# Patient Record
Sex: FEMALE | Race: WHITE | HISPANIC OR LATINO | Employment: FULL TIME | URBAN - METROPOLITAN AREA
[De-identification: names, ages, dates, MRNs, and addresses within clinical notes are randomized per-mention and may not be internally consistent; named-entity substitution may affect disease eponyms.]

---

## 2017-01-11 ENCOUNTER — TELEPHONE (OUTPATIENT)
Dept: FAMILY MEDICINE | Facility: CLINIC | Age: 52
End: 2017-01-11

## 2017-01-11 NOTE — TELEPHONE ENCOUNTER
Panel Management Review      Patient has the following on her problem list: None      Composite cancer screening  Chart review shows that this patient is due/due soon for the following Mammogram and Colonoscopy  Summary:    Patient is due/failing the following:   COLONOSCOPY and MAMMOGRAM    Action needed:   Pt needs to make an appt for a mammogram and a colon cancer screen.     Type of outreach:    Sent [employer] message.    Questions for provider review:    None                                                                                                                                    Leydi Cobb MA

## 2017-01-20 ENCOUNTER — DOCUMENTATION ONLY (OUTPATIENT)
Dept: FAMILY MEDICINE | Facility: CLINIC | Age: 52
End: 2017-01-20

## 2017-03-24 ENCOUNTER — OFFICE VISIT (OUTPATIENT)
Dept: FAMILY MEDICINE | Facility: CLINIC | Age: 52
End: 2017-03-24
Payer: COMMERCIAL

## 2017-03-24 VITALS
TEMPERATURE: 98.2 F | WEIGHT: 136.1 LBS | HEART RATE: 71 BPM | DIASTOLIC BLOOD PRESSURE: 80 MMHG | BODY MASS INDEX: 23.23 KG/M2 | HEIGHT: 64 IN | RESPIRATION RATE: 16 BRPM | SYSTOLIC BLOOD PRESSURE: 102 MMHG | OXYGEN SATURATION: 99 %

## 2017-03-24 DIAGNOSIS — N95.0 POST-MENOPAUSAL BLEEDING: ICD-10-CM

## 2017-03-24 DIAGNOSIS — Z13.1 SCREENING FOR DIABETES MELLITUS: Primary | ICD-10-CM

## 2017-03-24 DIAGNOSIS — R42 VERTIGO: ICD-10-CM

## 2017-03-24 DIAGNOSIS — Z11.59 NEED FOR HEPATITIS C SCREENING TEST: ICD-10-CM

## 2017-03-24 DIAGNOSIS — J31.0 CHRONIC RHINITIS: ICD-10-CM

## 2017-03-24 DIAGNOSIS — G56.03 BILATERAL CARPAL TUNNEL SYNDROME: ICD-10-CM

## 2017-03-24 DIAGNOSIS — Z13.220 LIPID SCREENING: ICD-10-CM

## 2017-03-24 DIAGNOSIS — G43.101 MIGRAINE WITH AURA AND WITH STATUS MIGRAINOSUS, NOT INTRACTABLE: ICD-10-CM

## 2017-03-24 DIAGNOSIS — J30.2 SEASONAL ALLERGIES: ICD-10-CM

## 2017-03-24 DIAGNOSIS — J32.0 CHRONIC MAXILLARY SINUSITIS: ICD-10-CM

## 2017-03-24 DIAGNOSIS — Z23 NEED FOR PROPHYLACTIC VACCINATION WITH TETANUS-DIPHTHERIA (TD): ICD-10-CM

## 2017-03-24 DIAGNOSIS — Z12.4 SCREENING FOR MALIGNANT NEOPLASM OF CERVIX: ICD-10-CM

## 2017-03-24 DIAGNOSIS — Z12.31 VISIT FOR SCREENING MAMMOGRAM: ICD-10-CM

## 2017-03-24 DIAGNOSIS — Z12.11 SCREEN FOR COLON CANCER: ICD-10-CM

## 2017-03-24 PROCEDURE — 90471 IMMUNIZATION ADMIN: CPT | Performed by: NURSE PRACTITIONER

## 2017-03-24 PROCEDURE — G0145 SCR C/V CYTO,THINLAYER,RESCR: HCPCS | Performed by: NURSE PRACTITIONER

## 2017-03-24 PROCEDURE — 87624 HPV HI-RISK TYP POOLED RSLT: CPT | Performed by: NURSE PRACTITIONER

## 2017-03-24 PROCEDURE — 90715 TDAP VACCINE 7 YRS/> IM: CPT | Performed by: NURSE PRACTITIONER

## 2017-03-24 PROCEDURE — 99396 PREV VISIT EST AGE 40-64: CPT | Mod: 25 | Performed by: NURSE PRACTITIONER

## 2017-03-24 RX ORDER — SUMATRIPTAN 100 MG/1
TABLET, FILM COATED ORAL
Qty: 18 TABLET | Refills: 3 | Status: SHIPPED | OUTPATIENT
Start: 2017-03-24 | End: 2017-04-14

## 2017-03-24 RX ORDER — MECLIZINE HYDROCHLORIDE 25 MG/1
25 TABLET ORAL EVERY 6 HOURS PRN
Qty: 90 TABLET | Refills: 3 | Status: SHIPPED | OUTPATIENT
Start: 2017-03-24 | End: 2018-03-30

## 2017-03-24 RX ORDER — CETIRIZINE HYDROCHLORIDE 10 MG/1
10 TABLET ORAL EVERY EVENING
Qty: 90 TABLET | Refills: 3 | Status: SHIPPED | OUTPATIENT
Start: 2017-03-24 | End: 2018-03-30

## 2017-03-24 RX ORDER — CEFUROXIME AXETIL 250 MG/1
6 TABLET ORAL
Qty: 1 KIT | Refills: 0 | Status: SHIPPED | OUTPATIENT
Start: 2017-03-24 | End: 2017-04-14

## 2017-03-24 RX ORDER — FLUTICASONE PROPIONATE 50 MCG
1-2 SPRAY, SUSPENSION (ML) NASAL DAILY
Qty: 48 G | Refills: 3 | Status: SHIPPED | OUTPATIENT
Start: 2017-03-24 | End: 2018-03-30 | Stop reason: SINTOL

## 2017-03-24 NOTE — MR AVS SNAPSHOT
After Visit Summary   3/24/2017    Merlene Mace    MRN: [id number]           Patient Information     Date Of Birth          1965        Visit Information        Provider Department      3/24/2017 1:00 PM Stephany Ferrer APRN [facility]        Today's Diagnoses     Screening for diabetes mellitus    -  1    Screen for colon cancer        Visit for screening mammogram        Screening for malignant neoplasm of cervix        Need for hepatitis C screening test        Need for prophylactic vaccination with tetanus-diphtheria (TD)        Lipid screening        Migraine with aura and with status migrainosus, not intractable        Chronic rhinitis        Chronic maxillary sinusitis        Seasonal allergies        Vertigo        Post-menopausal bleeding        Bilateral carpal tunnel syndrome          Care Instructions    Try the injectable imitrex and if headaches are not getting better follow up with me and we will try a different preventive med.  OK for phone visit.  Track for three months.      Watch imitrex use as it can become a cause of migraines    Pelvic ultrasound  will call you    [employer] labs fasting. Make an appt.      You have orders for a fit test.  You can pick it up whenever you want.    Mammo  will call you    Carpal tunnel orthopedics will call you                Follow-ups after your visit        Additional Services     ORTHO  REFERRAL       [facility] is referring you to the Orthopedic  Services at [facility].       The  Representative will assist you in the coordination of your Orthopedic and Musculoskeletal Care as prescribed by your physician.    The  Representative will call you within 1 business day to help schedule your appointment, or you may contact the  Representative at:    All areas ~ [phone]     Type of Referral : Hand       Timeframe  requested: 3 - 5 days    Coverage of these services is subject to the terms and limitations of your health insurance plan.  Please call member services at your health plan with any benefit or coverage questions.      If X-rays, CT or MRI's have been performed, please contact the facility where they were done to arrange for , prior to your scheduled appointment.  Please bring this referral request to your appointment and present it to your specialist.                  Future tests that were ordered for you today     Open Future Orders        Priority Expected Expires Ordered    US Pelvic Complete w Transvaginal Routine 6/22/2017 3/24/2018 3/24/2017    Fecal colorectal cancer screen (FIT) Routine 4/14/2017 6/16/2017 3/24/2017    GLUCOSE Routine  3/24/2018 3/24/2017    MA SCREENING DIGITAL BILAT - Future  (s+30) Routine  3/24/2018 3/24/2017            Who to contact     If you have questions or need follow up information about today's clinic visit or your schedule please contact [facility] directly at [phone].  Normal or non-critical lab and imaging results will be communicated to you by MyChart, letter or phone within 4 business days after the clinic has received the results. If you do not hear from us within 7 days, please contact the clinic through [employer] or phone. If you have a critical or abnormal lab result, we will notify you by phone as soon as possible.  Submit refill requests through [employer] or call your pharmacy and they will forward the refill request to us. Please allow 3 business days for your refill to be completed.          Additional Information About Your Visit        e[employer] Information     [employer] gives you secure access to your electronic health record. If you see a primary care provider, you can also send messages to your care team and make appointments. If you have questions, please call your primary care clinic.  If you do not have a primary care provider, please  "call [phone] and they will assist you.        Care EveryWhere ID     This is your Care EveryWhere ID. This could be used by other organizations to access your [place] medical records  [id number]        Your Vitals Were     Pulse Temperature Respirations Height Pulse Oximetry BMI (Body Mass Index)    71 98.2  F (36.8  C) (Oral) 16 5' 4\" (1.626 m) 99% 23.36 kg/m2       Blood Pressure from Last 3 Encounters:   03/24/17 102/80   03/25/16 100/70   03/27/15 100/68    Weight from Last 3 Encounters:   03/24/17 136 lb 1.6 oz (61.7 kg)   03/25/16 143 lb (64.9 kg)   03/27/15 138 lb 1.6 oz (62.6 kg)              We Performed the Following     HPV High Risk Types DNA Cervical     Lipid panel reflex to direct LDL     ORTHO  REFERRAL     Pap imaged thin layer screen with HPV - recommended age 30 - 65 years (select HPV order below)     TDAP VACCINE (ADACEL)          Today's Medication Changes          These changes are accurate as of: 3/24/17  1:46 PM.  If you have any questions, ask your nurse or doctor.               These medicines have changed or have updated prescriptions.        Dose/Directions    * SUMAtriptan 100 MG tablet   Commonly known as:  IMITREX   This may have changed:  Another medication with the same name was added. Make sure you understand how and when to take each.   Used for:  Migraine with aura and with status migrainosus, not intractable   Changed by:  Stephany Ferrer APRN CNP        May repeat in 2 hours if needed: max 2/day; average number of headaches monthly 1 TABLET 1 TIME ONLY, may repeat in 2 hours   Quantity:  18 tablet   Refills:  3       * SUMAtriptan 6 MG/0.5ML pen injector kit   Commonly known as:  IMITREX STATDOSE   This may have changed:  You were already taking a medication with the same name, and this prescription was added. Make sure you understand how and when to take each.   Used for:  Migraine with aura and with status migrainosus, not intractable   Changed by:  Nabil" KIRT Hammond CNP        Dose:  6 mg   Inject 0.5 mLs (6 mg) Subcutaneous at onset of headache for migraine May repeat in 1 hour. Max 12 mg/24 hours.   Quantity:  1 kit   Refills:  0       * SUMAtriptan 6 MG/0.5ML refill cartridge   Commonly known as:  IMITREX STATDOSE   This may have changed:  You were already taking a medication with the same name, and this prescription was added. Make sure you understand how and when to take each.   Used for:  Migraine with aura and with status migrainosus, not intractable   Changed by:  Stephany Ferrer APRN CNP        Dose:  6 mg   Inject 0.5 mLs (6 mg) Subcutaneous at onset of headache for migraine May repeat in 1 hour. Max 12 mg/24 hours.   Quantity:  2 cartridge   Refills:  3       * Notice:  This list has 3 medication(s) that are the same as other medications prescribed for you. Read the directions carefully, and ask your doctor or other care provider to review them with you.         Where to get your medicines      These medications were sent to [facility] [address] [place] [address] - [address] [id number] [facility] [address]     Phone:  [phone]     cetirizine 10 MG tablet    fluticasone 50 MCG/ACT spray    meclizine 25 MG tablet    SUMAtriptan 100 MG tablet    SUMAtriptan 6 MG/0.5ML pen injector kit    SUMAtriptan 6 MG/0.5ML refill cartridge                Primary Care Provider Office Phone # Fax #    KIRT Tony [phone]       [facility] [address]  [facility] [address]        Thank you!     Thank you for choosing [facility]  for your care. Our goal is always to provide you with excellent care. Hearing back from our patients is one way we can continue to improve our services. Please take a few minutes to complete the written survey that you may receive in the mail after your visit with us. Thank you!             Your Updated Medication List - Protect others around you: Learn how to  safely use, store and throw away your medicines at www.disposemymeds.org.          This list is accurate as of: 3/24/17  1:46 PM.  Always use your most recent med list.                   Brand Name Dispense Instructions for use    cetirizine 10 MG tablet    zyrTEC    90 tablet    Take 1 tablet (10 mg) by mouth every evening       fluticasone 50 MCG/ACT spray    FLONASE    48 g    Spray 1-2 sprays into both nostrils daily       meclizine 25 MG tablet    ANTIVERT    90 tablet    Take 1 tablet (25 mg) by mouth every 6 hours as needed for dizziness       * SUMAtriptan 100 MG tablet    IMITREX    18 tablet    May repeat in 2 hours if needed: max 2/day; average number of headaches monthly 1 TABLET 1 TIME ONLY, may repeat in 2 hours       * SUMAtriptan 6 MG/0.5ML pen injector kit    IMITREX STATDOSE    1 kit    Inject 0.5 mLs (6 mg) Subcutaneous at onset of headache for migraine May repeat in 1 hour. Max 12 mg/24 hours.       * SUMAtriptan 6 MG/0.5ML refill cartridge    IMITREX STATDOSE    2 cartridge    Inject 0.5 mLs (6 mg) Subcutaneous at onset of headache for migraine May repeat in 1 hour. Max 12 mg/24 hours.       * Notice:  This list has 3 medication(s) that are the same as other medications prescribed for you. Read the directions carefully, and ask your doctor or other care provider to review them with you.

## 2017-03-24 NOTE — PATIENT INSTRUCTIONS
Try the injectable imitrex and if headaches are not getting better follow up with me and we will try a different preventive med.  OK for phone visit.  Track for three months.      Watch imitrex use as it can become a cause of migraines    Pelvic ultrasound  will call you    [employer] labs fasting. Make an appt.      You have orders for a fit test.  You can pick it up whenever you want.    Mammo  will call you    Carpal tunnel orthopedics will call you

## 2017-03-24 NOTE — NURSING NOTE
"Chief Complaint   Patient presents with     Physical       Initial /80 (BP Location: Right arm, Patient Position: Chair, Cuff Size: Adult Regular)  Pulse 71  Temp 98.2  F (36.8  C) (Oral)  Resp 16  Ht 5' 4\" (1.626 m)  Wt 136 lb 1.6 oz (61.7 kg)  SpO2 99%  BMI 23.36 kg/m2 Estimated body mass index is 23.36 kg/(m^2) as calculated from the following:    Height as of this encounter: 5' 4\" (1.626 m).    Weight as of this encounter: 136 lb 1.6 oz (61.7 kg).  Medication Reconciliation: complete   Leydi Cobb MA      "

## 2017-03-24 NOTE — PROGRESS NOTES
SUBJECTIVE:     CC: Merlene Mace is an 51 year old woman who presents for preventive health visit.     Concerns:  Would like to try the imitrex injection.  Migraines are often sudden onset without aura.  Triggers are allergy, weather, sinuses.  Lack of sleep and stress also contribute.  1-2 per month but headaches last several days.  No new features to headaches.    Also:  Carpal tunnel symptoms are worsening despite using braces.  Having more symptoms affecting ADL.  Would like a referral. No weakness that is noted    Physical   Annual:     Getting at least 3 servings of Calcium per day::  Yes    Bi-annual eye exam::  Yes    Dental care twice a year::  Yes    Sleep apnea or symptoms of sleep apnea::  None    Diet::  Regular (no restrictions)    Frequency of exercise::  6-7 days/week    Duration of exercise::  30-45 minutes    Taking medications regularly::  Yes    Medication side effects::  None    Additional concerns today::  No            Today's PHQ-2 Score:   PHQ-2 ( 1999 Pfizer) 3/21/2017   Little interest or pleasure in doing things Not at all   Feeling down, depressed or hopeless Not at all   PHQ-2 Score 0       Abuse: Current or Past(Physical, Sexual or Emotional)- No  Do you feel safe in your environment - Yes    Social History   Substance Use Topics     Smoking status: Never Smoker     Smokeless tobacco: Never Used     Alcohol use Yes      Comment: occasional, one every few months     The patient does not drink >3 drinks per day nor >7 drinks per week.    Recent Labs   Lab Test  03/12/09   0923   CHOL  199   HDL  65   LDL  112   TRIG  111   CHOLHDLRATIO  3.1       Reviewed orders with patient.  Reviewed health maintenance and updated orders accordingly - Yes    ROS:  C: NEGATIVE for fever, chills, change in weight  I: NEGATIVE for worrisome rashes, moles or lesions  E: NEGATIVE for vision changes or irritation  ENT: NEGATIVE for ear, mouth and throat problems  R: NEGATIVE for significant cough or  "SOB  B: NEGATIVE for masses, tenderness or discharge  CV: NEGATIVE for chest pain, palpitations or peripheral edema  GI: NEGATIVE for nausea, abdominal pain, heartburn, or change in bowel habits  : NEGATIVE for unusual urinary or vaginal symptoms. No vaginal bleeding.  M: NEGATIVE for significant arthralgias or myalgia  N: NEGATIVE for weakness, dizziness or paresthesias  P: NEGATIVE for changes in mood or affect     Problem list, Medication list, Allergies, and Medical/Social/Surgical histories reviewed in [facility] and updated as appropriate.  OBJECTIVE:     /80 (BP Location: Right arm, Patient Position: Chair, Cuff Size: Adult Regular)  Pulse 71  Temp 98.2  F (36.8  C) (Oral)  Resp 16  Ht 5' 4\" (1.626 m)  Wt 136 lb 1.6 oz (61.7 kg)  SpO2 99%  BMI 23.36 kg/m2  EXAM:  GENERAL APPEARANCE: healthy, alert and no distress  EYES: Eyes grossly normal to inspection, PERRL and conjunctivae and sclerae normal  HENT: ear canals and TM's normal, nose and mouth without ulcers or lesions, oropharynx clear and oral mucous membranes moist  NECK: no adenopathy, no asymmetry, masses, or scars and thyroid normal to palpation  RESP: lungs clear to auscultation - no rales, rhonchi or wheezes  BREAST: normal without masses, tenderness or nipple discharge and no palpable axillary masses or adenopathy  CV: regular rate and rhythm, normal S1 S2, no S3 or S4, no murmur, click or rub, no peripheral edema and peripheral pulses strong  ABDOMEN: soft, nontender, no hepatosplenomegaly, no masses and bowel sounds normal  MS: no musculoskeletal defects are noted and gait is age appropriate without ataxia  SKIN: no suspicious lesions or rashes  NEURO: Normal strength and tone, sensory exam grossly normal, mentation intact and speech normal  PSYCH: mentation appears normal and affect normal/bright    ASSESSMENT/PLAN:         ICD-10-CM    1. Screening for diabetes mellitus Z13.1 GLUCOSE   2. Screen for colon cancer Z12.11 Fecal colorectal " "cancer screen (FIT)   3. Visit for screening mammogram Z12.31 MA SCREENING DIGITAL BILAT - Future  (s+30)   4. Screening for malignant neoplasm of cervix Z12.4 Pap imaged thin layer screen with HPV - recommended age 30 - 65 years (select HPV order below)     HPV High Risk Types DNA Cervical   5. Need for hepatitis C screening test Z11.59    6. Need for prophylactic vaccination with tetanus-diphtheria (TD) Z23 TDAP VACCINE (ADACEL)   7. Lipid screening Z13.220 **Lipid panel reflex to direct LDL FUTURE 1yr     CANCELED: Lipid panel reflex to direct LDL   8. Migraine with aura and with status migrainosus, not intractable G43.101 SUMAtriptan (IMITREX) 100 MG tablet     SUMAtriptan (IMITREX STATDOSE) 6 MG/0.5ML pen injector kit     SUMAtriptan (IMITREX STATDOSE) 6 MG/0.5ML refill cartridge   9. Chronic rhinitis J31.0 fluticasone (FLONASE) 50 MCG/ACT spray   10. Chronic maxillary sinusitis J32.0 cetirizine (ZYRTEC) 10 MG tablet   11. Seasonal allergies J30.2 cetirizine (ZYRTEC) 10 MG tablet   12. Vertigo R42 meclizine (ANTIVERT) 25 MG tablet   13. Post-menopausal bleeding N95.0 US Pelvic Complete w Transvaginal   14. Bilateral carpal tunnel syndrome G56.03 ORTHO  REFERRAL       COUNSELING:  Reviewed preventive health counseling, as reflected in patient instructions         reports that she has never smoked. She has never used smokeless tobacco.    Estimated body mass index is 23.36 kg/(m^2) as calculated from the following:    Height as of this encounter: 5' 4\" (1.626 m).    Weight as of this encounter: 136 lb 1.6 oz (61.7 kg).       Counseling Resources:  ATP IV Guidelines  Pooled Cohorts Equation Calculator  Breast Cancer Risk Calculator  FRAX Risk Assessment  ICSI Preventive Guidelines  Dietary Guidelines for Americans, 2010  USDA's MyPlate  ASA Prophylaxis  Lung CA Screening    KIRT Tony [facility] [place]  "

## 2017-03-24 NOTE — NURSING NOTE
Screening Questionnaire for Adult Immunization    Are you sick today?   No   Do you have allergies to medications, food, a vaccine component or latex?   No   Have you ever had a serious reaction after receiving a vaccination?   No   Do you have a long-term health problem with heart disease, lung disease, asthma, kidney disease, metabolic disease (e.g. diabetes), anemia, or other blood disorder?   No   Do you have cancer, leukemia, HIV/AIDS, or any other immune system problem?   No   In the past 3 months, have you taken medications that affect  your immune system, such as prednisone, other steroids, or anticancer drugs; drugs for the treatment of rheumatoid arthritis, Crohn s disease, or psoriasis; or have you had radiation treatments?   No   Have you had a seizure, or a brain or other nervous system problem?   No   During the past year, have you received a transfusion of blood or blood     products, or been given immune (gamma) globulin or antiviral drug?   No   For women: Are you pregnant or is there a chance you could become        pregnant during the next month?   No   Have you received any vaccinations in the past 4 weeks?   No     Immunization questionnaire answers were all negative.      MNVFC doesn't apply on this patient    Per orders of Liliana Ferrer, injection of Tdap given by Leydi Cobb. Patient instructed to remain in clinic for 20 minutes afterwards, and to report any adverse reaction to me immediately.       Screening performed by Leydi Cobb on 3/24/2017 at 2:22 PM.

## 2017-03-28 ENCOUNTER — TELEPHONE (OUTPATIENT)
Dept: FAMILY MEDICINE | Facility: CLINIC | Age: 52
End: 2017-03-28

## 2017-03-28 DIAGNOSIS — G43.909 MIGRAINE: Primary | ICD-10-CM

## 2017-03-28 LAB
COPATH REPORT: NORMAL
PAP: NORMAL

## 2017-03-28 NOTE — TELEPHONE ENCOUNTER
PA has been started through covermymeds.com, will wait for response.     Medication: SUMAtriptan (IMITREX STATDOSE) 6 MG/0.5ML pen injector kit and SUMAtriptan (IMITREX STATDOSE) 6 MG/0.5ML refill cartridge   Insurance ID: [id number]  Insurance ph #: [phone]  Leydi Cobb MA

## 2017-03-29 LAB
FINAL DIAGNOSIS: NORMAL
HPV HR 12 DNA CVX QL NAA+PROBE: NEGATIVE
HPV16 DNA SPEC QL NAA+PROBE: NEGATIVE
HPV18 DNA SPEC QL NAA+PROBE: NEGATIVE
SPECIMEN DESCRIPTION: NORMAL

## 2017-04-06 NOTE — TELEPHONE ENCOUNTER
PA has been denied due to pt not trying and/or failing on formulary options. Formulary medications covered are rizatriptan, sumatriptan 5mg, or 20mg nasal spray.     Please review, and if okay, please send a new script to patients pharmacy.   Leydi Cobb MA

## 2017-04-11 NOTE — TELEPHONE ENCOUNTER
Second attempt to contact pt regarding medication change. LM for pt to return call to clinic.   Leydi Cobb MA

## 2017-04-12 NOTE — TELEPHONE ENCOUNTER
Pt calls back.   She says she is already taking the sumatriptan 100 mg orally.  She said that she would try the sumatriptan nasal spray to go along with it unless you think the other would be better.      Will forward to Liliana Ferrer.

## 2017-04-14 RX ORDER — SUMATRIPTAN 20 MG/1
1 SPRAY NASAL PRN
Qty: 1 EACH | Refills: 1 | Status: SHIPPED | OUTPATIENT
Start: 2017-04-14 | End: 2019-04-19

## 2017-04-14 NOTE — TELEPHONE ENCOUNTER
Please let pt know that I sent the sumatriptan nasal spray for her to the pharmacy.  She should be aware that the max dose per day is 2 sprays per day.  She should consider an oral dose of sumatriptan pills to be equivalent to one spray of the nasal form and count this in the daily max dose as well.

## 2017-04-17 ENCOUNTER — HOSPITAL ENCOUNTER (OUTPATIENT)
Dept: MAMMOGRAPHY | Facility: CLINIC | Age: 52
Discharge: HOME | End: 2017-04-17
Attending: NURSE PRACTITIONER | Admitting: NURSE PRACTITIONER
Payer: COMMERCIAL

## 2017-04-17 ENCOUNTER — OFFICE VISIT (OUTPATIENT)
Dept: ORTHOPEDICS | Facility: CLINIC | Age: 52
End: 2017-04-17
Payer: COMMERCIAL

## 2017-04-17 VITALS
HEIGHT: 64 IN | DIASTOLIC BLOOD PRESSURE: 72 MMHG | BODY MASS INDEX: 23.22 KG/M2 | WEIGHT: 136 LBS | SYSTOLIC BLOOD PRESSURE: 92 MMHG

## 2017-04-17 DIAGNOSIS — Z12.31 VISIT FOR SCREENING MAMMOGRAM: ICD-10-CM

## 2017-04-17 DIAGNOSIS — G56.03 BILATERAL CARPAL TUNNEL SYNDROME: Primary | ICD-10-CM

## 2017-04-17 PROCEDURE — 99203 OFFICE O/P NEW LOW 30 MIN: CPT | Performed by: ORTHOPAEDIC SURGERY

## 2017-04-17 PROCEDURE — G0202 SCR MAMMO BI INCL CAD: HCPCS

## 2017-04-17 NOTE — PATIENT INSTRUCTIONS
What is Carpal Tunnel Syndrome (CTS)?  Carpal tunnel syndrome (CTS) is a problem that affects the wrist and hand. If you have CTS, tingling and numbness can make even simple tasks hard to do. But CTS can be treated, and your symptoms can be controlled.    Learning about carpal tunnel  The carpal tunnel is a narrow space inside the wrist that is surrounded by bone and ligament. This space lets certain tendons and a major nerve pass from the forearm into the hand. With CTS, the tendon sheaths may thicken and enlarge. This reduces the amount of space inside the carpal tunnel. As a result, the median nerve may be compressed.  The symptoms of CTS  Tingling and numbness are the most common symptoms of CTS. Some people also have hand pain or even a weakened . At first, symptoms may wake you up at night. Later, they may also occur during your daily routines. For instance, you may notice symptoms while you are driving or holding a newspaper. Your symptoms may become more severe over time.     Symptoms of CTS may keep you up at night.    Working with your doctor  Your doctor will perform an exam to learn more about your symptoms. Once your problem is diagnosed, you and your doctor can make a treatment plan. He or she may recommend wrist braces or splints, local corticosteroid injections, and/or surgery, depending on your history and the severity of your physical exam findings. If you have surgery, you are likely to go home the same day.    [id number] [employer]. [address] [place] [address]. All rights reserved. This information is not intended as a substitute for professional medical care. Always follow your healthcare professional's instructions.        Carpal Tunnel Release Surgery    Surgery may be done if your carpal tunnel syndrome (CTS) symptoms become severe. Or, you may have surgery if no other treatment brings relief. There are 2 types of CTS procedures. You will be told about the  one you will have. You ll also be instructed how to prepare for it.    The goals of surgery  Two types of surgery--open and endoscopic--are used to treat CTS.    With open surgery, your surgeon makes one incision in your palm. Standard surgical tools are used.    With endoscopic surgery, one or two small incisions may be made in your hand. A scope (with a very small camera attached) and tools are inserted under the carpal ligament. The surgeon then operates while watching images on a video screen. No matter which one you have, the goal remains the same: Your surgeon will relieve pressure on the median nerve. To do this, the transverse carpal ligament is cut (released).  After surgery  If you ve had carpal tunnel surgery, you will spend a few hours resting before you go home. The nerve sensation and circulation in your hand will be checked at this time. For the safest healing, keep the following in mind.    Keep your hand raised above heart level. This will help reduce swelling.    Limit hand and wrist use as instructed. A wrist brace may be required.    Take any pain medication as directed.    Do hand exercises as directed by your surgeon or therapist.  When to call the surgeon  Call your surgeon if you notice any of the following:    White or pale-blue hand or nails (If you pinch your skin or nail and the color doesn t return)    Pain that is not relieved by prescribed medicine    Loss of sensation or excess swelling in hand or fingers    Fever over 100.4 F (38 C)     7982-4899 [employer]. [address] [place] [address], [place] [address]. All rights reserved. This information is not intended as a substitute for professional medical care. Always follow your healthcare professional's instructions.

## 2017-04-17 NOTE — MR AVS SNAPSHOT
After Visit Summary   4/17/2017    Merlene Mace    MRN: [id number]           Patient Information     Date Of Birth          1965        Visit Information        Provider Department      4/17/2017 9:50 AM Darryl Ledesma MD [facility]        Today's Diagnoses     Bilateral carpal tunnel syndrome    -  1      Care Instructions      What is Carpal Tunnel Syndrome (CTS)?  Carpal tunnel syndrome (CTS) is a problem that affects the wrist and hand. If you have CTS, tingling and numbness can make even simple tasks hard to do. But CTS can be treated, and your symptoms can be controlled.    Learning about carpal tunnel  The carpal tunnel is a narrow space inside the wrist that is surrounded by bone and ligament. This space lets certain tendons and a major nerve pass from the forearm into the hand. With CTS, the tendon sheaths may thicken and enlarge. This reduces the amount of space inside the carpal tunnel. As a result, the median nerve may be compressed.  The symptoms of CTS  Tingling and numbness are the most common symptoms of CTS. Some people also have hand pain or even a weakened . At first, symptoms may wake you up at night. Later, they may also occur during your daily routines. For instance, you may notice symptoms while you are driving or holding a newspaper. Your symptoms may become more severe over time.     Symptoms of CTS may keep you up at night.    Working with your doctor  Your doctor will perform an exam to learn more about your symptoms. Once your problem is diagnosed, you and your doctor can make a treatment plan. He or she may recommend wrist braces or splints, local corticosteroid injections, and/or surgery, depending on your history and the severity of your physical exam findings. If you have surgery, you are likely to go home the same day.    7970-4387 [employer]. [address] [place] [address], [place] [address]. All rights reserved. This  information is not intended as a substitute for professional medical care. Always follow your healthcare professional's instructions.        Carpal Tunnel Release Surgery    Surgery may be done if your carpal tunnel syndrome (CTS) symptoms become severe. Or, you may have surgery if no other treatment brings relief. There are 2 types of CTS procedures. You will be told about the one you will have. You ll also be instructed how to prepare for it.    The goals of surgery  Two types of surgery--open and endoscopic--are used to treat CTS.    With open surgery, your surgeon makes one incision in your palm. Standard surgical tools are used.    With endoscopic surgery, one or two small incisions may be made in your hand. A scope (with a very small camera attached) and tools are inserted under the carpal ligament. The surgeon then operates while watching images on a video screen. No matter which one you have, the goal remains the same: Your surgeon will relieve pressure on the median nerve. To do this, the transverse carpal ligament is cut (released).  After surgery  If you ve had carpal tunnel surgery, you will spend a few hours resting before you go home. The nerve sensation and circulation in your hand will be checked at this time. For the safest healing, keep the following in mind.    Keep your hand raised above heart level. This will help reduce swelling.    Limit hand and wrist use as instructed. A wrist brace may be required.    Take any pain medication as directed.    Do hand exercises as directed by your surgeon or therapist.  When to call the surgeon  Call your surgeon if you notice any of the following:    White or pale-blue hand or nails (If you pinch your skin or nail and the color doesn t return)    Pain that is not relieved by prescribed medicine    Loss of sensation or excess swelling in hand or fingers    Fever over 100.4 F (38 C)     1612-3792 [employer]. [address] [place] [address], [place]  "[address]. All rights reserved. This information is not intended as a substitute for professional medical care. Always follow your healthcare professional's instructions.              Follow-ups after your visit        Who to contact     If you have questions or need follow up information about today's clinic visit or your schedule please contact [facility] ORTHOPEDIC SURGERY directly at [phone].  Normal or non-critical lab and imaging results will be communicated to you by MyChart, letter or phone within 4 business days after the clinic has received the results. If you do not hear from us within 7 days, please contact the clinic through [employer] or phone. If you have a critical or abnormal lab result, we will notify you by phone as soon as possible.  Submit refill requests through [employer] or call your pharmacy and they will forward the refill request to us. Please allow 3 business days for your refill to be completed.          Additional Information About Your Visit        [employer] Information     [employer] gives you secure access to your electronic health record. If you see a primary care provider, you can also send messages to your care team and make appointments. If you have questions, please call your primary care clinic.  If you do not have a primary care provider, please call [phone] and they will assist you.        Care EveryWhere ID     This is your Care EveryWhere ID. This could be used by other organizations to access your [place] medical records  [id number]        Your Vitals Were     Height BMI (Body Mass Index)                5' 4\" (1.626 m) 23.34 kg/m2           Blood Pressure from Last 3 Encounters:   04/17/17 92/72   03/24/17 102/80   03/25/16 100/70    Weight from Last 3 Encounters:   04/17/17 136 lb (61.7 kg)   03/24/17 136 lb 1.6 oz (61.7 kg)   03/25/16 143 lb (64.9 kg)              Today, you had the following     No orders found for display       Primary Care Provider Office Phone # " Fax #    KIRT Tony [phone]       [facility] [address]  [facility] [address]        Thank you!     Thank you for choosing [facility] ORTHOPEDIC SURGERY  for your care. Our goal is always to provide you with excellent care. Hearing back from our patients is one way we can continue to improve our services. Please take a few minutes to complete the written survey that you may receive in the mail after your visit with us. Thank you!             Your Updated Medication List - Protect others around you: Learn how to safely use, store and throw away your medicines at www.disposemymeds.org.          This list is accurate as of: 4/17/17 10:17 AM.  Always use your most recent med list.                   Brand Name Dispense Instructions for use    cetirizine 10 MG tablet    zyrTEC    90 tablet    Take 1 tablet (10 mg) by mouth every evening       fluticasone 50 MCG/ACT spray    FLONASE    48 g    Spray 1-2 sprays into both nostrils daily       meclizine 25 MG tablet    ANTIVERT    90 tablet    Take 1 tablet (25 mg) by mouth every 6 hours as needed for dizziness       SUMAtriptan 20 MG/ACT nasal spray    IMITREX    1 each    Spray 1 spray in nostril as needed for migraine May repeat in 2 hours. Max 2 sprays/24 hours.

## 2017-04-17 NOTE — LETTER
4/17/2017       RE: Merlene Mace  [address] [place] [address]           Dear Colleague,    Thank you for referring your patient, Merlene Mace, to the [facility] ORTHOPEDIC SURGERY. Please see a copy of my visit note below.    HISTORY OF PRESENT ILLNESS:    Merlene Mace is a 51 year old female who is seen in consultation at the request of Dr. Ferrer for bilateral carpal tunnel    Present symptoms: Pt states symptoms have been present since 2004.  Pt states right side is worse than the left, pt is right hand dominant.  Pt states numbness is almost constant now, it is in the thumb, index and middle fingers.  Pt states she is having more weakness in the hands, difficulty opening sandwich bags.  Pt states she had some relief with the hand therapy, braces and injections but symptoms have progressed since that time.  She is right-hand dominant.  Treatments tried to this point: cortisone injections, hand therapy, EMG - 2004  Orthopedic PMH: no ortho surgeries     Past Medical History:   Diagnosis Date     Carpal tunnel syndrome      Dysfunction of eustachian tube      Migraine, unspecified, without mention of intractable migraine without mention of status migrainosus        Past Surgical History:   Procedure Laterality Date     CL AFF SURGICAL PATHOLOGY  1993       Family History   Problem Relation Age of Onset     Family History Negative Mother      alive age 58     Family History Negative Father      alive age 60     CANCER Maternal Grandmother      brain       Social History     Social History     Marital status: Single     Spouse name: N/A     Number of children: N/A     Years of education: 12     Occupational History     --research consulting      Social History Main Topics     Smoking status: Never Smoker     Smokeless tobacco: Never Used     Alcohol use Yes      Comment: occasional, one every few months     Drug use: No     Sexual activity: Yes     Partners: Male       "Comment: Ortho Evra Patch     Other Topics Concern     Not on file     Social History Narrative       Current Outpatient Prescriptions   Medication Sig Dispense Refill     SUMAtriptan (IMITREX) 20 MG/ACT nasal spray Spray 1 spray in nostril as needed for migraine May repeat in 2 hours. Max 2 sprays/24 hours. (Patient not taking: Reported on 4/17/2017) 1 each 1     fluticasone (FLONASE) 50 MCG/ACT spray Spray 1-2 sprays into both nostrils daily (Patient not taking: Reported on 4/17/2017) 48 g 3     cetirizine (ZYRTEC) 10 MG tablet Take 1 tablet (10 mg) by mouth every evening (Patient not taking: Reported on 4/17/2017) 90 tablet 3     meclizine (ANTIVERT) 25 MG tablet Take 1 tablet (25 mg) by mouth every 6 hours as needed for dizziness (Patient not taking: Reported on 4/17/2017) 90 tablet 3       Allergies   Allergen Reactions     No Known Drug Allergies        REVIEW OF SYSTEMS:  CONSTITUTIONAL:  NEGATIVE for fever, chills, change in weight  INTEGUMENTARY/SKIN:  NEGATIVE for worrisome rashes, moles or lesions  EYES:  NEGATIVE for vision changes or irritation  ENT/MOUTH:  NEGATIVE for ear, mouth and throat problems  RESP:  NEGATIVE for significant cough or SOB  BREAST:  NEGATIVE for masses, tenderness or discharge  CV:  NEGATIVE for chest pain, palpitations or peripheral edema  GI:  NEGATIVE for nausea, abdominal pain, heartburn, or change in bowel habits  :  Negative   MUSCULOSKELETAL:  See HPI above  NEURO:  NEGATIVE for weakness, dizziness or paresthesias  ENDOCRINE:  NEGATIVE for temperature intolerance, skin/hair changes  HEME/ALLERGY/IMMUNE:  NEGATIVE for bleeding problems  PSYCHIATRIC:  NEGATIVE for changes in mood or affect      PHYSICAL EXAM:  BP 92/72 (BP Location: Right arm, Patient Position: Chair, Cuff Size: Adult Regular)  Ht 5' 4\" (1.626 m)  Wt 136 lb (61.7 kg)  BMI 23.34 kg/m2  Body mass index is 23.34 kg/(m^2).   GENERAL APPEARANCE: healthy, alert and no distress   HEENT: No apparent thyroid " megaly. Clear sclera with normal ocular movement  RESPIRATORY: No labored breathing  SKIN: no suspicious lesions or rashes  NEURO: Normal strength and tone, mentation intact and speech normal  VASCULAR: Good pulses, and capillary refill   LYMPH: no lymphadenopathy   PSYCH:  mentation appears normal and affect normal/bright    MUSCULOSKELETAL:  Gait is normal  Neck movement is full without pain  Shoulder movement is full with full strength  Elbow range of motion is full with a full strength  Wrist range of motion is full bilaterally  Finger movement is full bilaterally  No focal atrophy of the thenar eminences   strength is grossly intact  Full strength of abduction and adduction of the fingers  Positive Tinel at the palms  Positive Phalen in less than 20 seconds  Decreased sensation of the thumb, index and long fingers, bilaterally compared to the ring and little fingers    ASSESSMENT:  Chronic bilateral carpal tunnel syndrome,, right worse than left, with progressive worsening    PLAN:  Her symptoms and physical examination findings were felt to be rather classic for carpal tunnel syndrome.  With exhaustion of all reasonable options including bracing, hand therapy and cortisone injections, at this point, surgical intervention a carpal tunnel release was felt to be very reasonable.  Choices for anesthesia were discussed. She feels comfortable of  Doing the surgery under local anesthesia.  What to expect from the surgery was thoroughly explained.  She may not need to take time off from work.  She wants to stop for carpal tunnel release on the right for the time being on the local anesthesia.    Imaging Interpretation:   None today    Darryl Ledesma MD  Department of Orthopedic Surgery        Disclaimer: This note consists of symbols derived from keyboarding, dictation and/or voice recognition software. As a result, there may be errors in the script that have gone undetected. Please consider this when interpreting  information found in this chart.      Again, thank you for allowing me to participate in the care of your patient.        Sincerely,              Darryl Ledesma MD

## 2017-04-17 NOTE — NURSING NOTE
"Chief Complaint   Patient presents with     Cts     Bilateral carpal tunnel       Initial BP 92/72 (BP Location: Right arm, Patient Position: Chair, Cuff Size: Adult Regular)  Ht 5' 4\" (1.626 m)  Wt 136 lb (61.7 kg)  BMI 23.34 kg/m2 Estimated body mass index is 23.34 kg/(m^2) as calculated from the following:    Height as of this encounter: 5' 4\" (1.626 m).    Weight as of this encounter: 136 lb (61.7 kg).  Medication Reconciliation: complete    "

## 2017-04-17 NOTE — PROGRESS NOTES
HISTORY OF PRESENT ILLNESS:    Merlene Mace is a 51 year old female who is seen in consultation at the request of Dr. Ferrer for bilateral carpal tunnel    Present symptoms: Pt states symptoms have been present since 2004.  Pt states right side is worse than the left, pt is right hand dominant.  Pt states numbness is almost constant now, it is in the thumb, index and middle fingers.  Pt states she is having more weakness in the hands, difficulty opening sandwich bags.  Pt states she had some relief with the hand therapy, braces and injections but symptoms have progressed since that time.  She is right-hand dominant.  Treatments tried to this point: cortisone injections, hand therapy, EMG - 2004  Orthopedic PMH: no ortho surgeries     Past Medical History:   Diagnosis Date     Carpal tunnel syndrome      Dysfunction of eustachian tube      Migraine, unspecified, without mention of intractable migraine without mention of status migrainosus        Past Surgical History:   Procedure Laterality Date     CL AFF SURGICAL PATHOLOGY  1993       Family History   Problem Relation Age of Onset     Family History Negative Mother      alive age 58     Family History Negative Father      alive age 60     CANCER Maternal Grandmother      brain       Social History     Social History     Marital status: Single     Spouse name: N/A     Number of children: N/A     Years of education: 12     Occupational History     --research consulting      Social History Main Topics     Smoking status: Never Smoker     Smokeless tobacco: Never Used     Alcohol use Yes      Comment: occasional, one every few months     Drug use: No     Sexual activity: Yes     Partners: Male      Comment: Orth[facility] Patch     Other Topics Concern     Not on file     Social History Narrative       Current Outpatient Prescriptions   Medication Sig Dispense Refill     SUMAtriptan (IMITREX) 20 MG/ACT nasal spray Spray 1 spray in nostril as needed for  "migraine May repeat in 2 hours. Max 2 sprays/24 hours. (Patient not taking: Reported on 4/17/2017) 1 each 1     fluticasone (FLONASE) 50 MCG/ACT spray Spray 1-2 sprays into both nostrils daily (Patient not taking: Reported on 4/17/2017) 48 g 3     cetirizine (ZYRTEC) 10 MG tablet Take 1 tablet (10 mg) by mouth every evening (Patient not taking: Reported on 4/17/2017) 90 tablet 3     meclizine (ANTIVERT) 25 MG tablet Take 1 tablet (25 mg) by mouth every 6 hours as needed for dizziness (Patient not taking: Reported on 4/17/2017) 90 tablet 3       Allergies   Allergen Reactions     No Known Drug Allergies        REVIEW OF SYSTEMS:  CONSTITUTIONAL:  NEGATIVE for fever, chills, change in weight  INTEGUMENTARY/SKIN:  NEGATIVE for worrisome rashes, moles or lesions  EYES:  NEGATIVE for vision changes or irritation  ENT/MOUTH:  NEGATIVE for ear, mouth and throat problems  RESP:  NEGATIVE for significant cough or SOB  BREAST:  NEGATIVE for masses, tenderness or discharge  CV:  NEGATIVE for chest pain, palpitations or peripheral edema  GI:  NEGATIVE for nausea, abdominal pain, heartburn, or change in bowel habits  :  Negative   MUSCULOSKELETAL:  See HPI above  NEURO:  NEGATIVE for weakness, dizziness or paresthesias  ENDOCRINE:  NEGATIVE for temperature intolerance, skin/hair changes  HEME/ALLERGY/IMMUNE:  NEGATIVE for bleeding problems  PSYCHIATRIC:  NEGATIVE for changes in mood or affect      PHYSICAL EXAM:  BP 92/72 (BP Location: Right arm, Patient Position: Chair, Cuff Size: Adult Regular)  Ht 5' 4\" (1.626 m)  Wt 136 lb (61.7 kg)  BMI 23.34 kg/m2  Body mass index is 23.34 kg/(m^2).   GENERAL APPEARANCE: healthy, alert and no distress   HEENT: No apparent thyroid megaly. Clear sclera with normal ocular movement  RESPIRATORY: No labored breathing  SKIN: no suspicious lesions or rashes  NEURO: Normal strength and tone, mentation intact and speech normal  VASCULAR: Good pulses, and capillary refill   LYMPH: no " lymphadenopathy   PSYCH:  mentation appears normal and affect normal/bright    MUSCULOSKELETAL:  Gait is normal  Neck movement is full without pain  Shoulder movement is full with full strength  Elbow range of motion is full with a full strength  Wrist range of motion is full bilaterally  Finger movement is full bilaterally  No focal atrophy of the thenar eminences   strength is grossly intact  Full strength of abduction and adduction of the fingers  Positive Tinel at the palms  Positive Phalen in less than 20 seconds  Decreased sensation of the thumb, index and long fingers, bilaterally compared to the ring and little fingers    ASSESSMENT:  Chronic bilateral carpal tunnel syndrome,, right worse than left, with progressive worsening    PLAN:  Her symptoms and physical examination findings were felt to be rather classic for carpal tunnel syndrome.  With exhaustion of all reasonable options including bracing, hand therapy and cortisone injections, at this point, surgical intervention a carpal tunnel release was felt to be very reasonable.  Choices for anesthesia were discussed. She feels comfortable of  Doing the surgery under local anesthesia.  What to expect from the surgery was thoroughly explained.  She may not need to take time off from work.  She wants to stop for carpal tunnel release on the right for the time being on the local anesthesia.    Imaging Interpretation:   None today    Darryl Ledesma MD  Department of Orthopedic Surgery        Disclaimer: This note consists of symbols derived from keyboarding, dictation and/or voice recognition software. As a result, there may be errors in the script that have gone undetected. Please consider this when interpreting information found in this chart.

## 2017-05-04 ENCOUNTER — TELEPHONE (OUTPATIENT)
Dept: FAMILY MEDICINE | Facility: CLINIC | Age: 52
End: 2017-05-04

## 2017-05-04 ENCOUNTER — TELEPHONE (OUTPATIENT)
Dept: ORTHOPEDICS | Facility: CLINIC | Age: 52
End: 2017-05-04

## 2017-05-04 NOTE — TELEPHONE ENCOUNTER
Panel Management Review      Patient has the following on her problem list: None      Composite cancer screening  Chart review shows that this patient is due/due soon for the following Fecal Colorectal (FIT)  Summary:    Patient is due/failing the following:   FIT    Action needed:   Pt needs to  FIT test from lab and complete.     Type of outreach:    Sent [employer] message.    Questions for provider review:    None                                                                                                                                    Leydi Cobb MA

## 2017-05-04 NOTE — LETTER
May 18, 2017      Merlene Mace  [address] [place] [address]        Dear Luba,     According to our records, you are due for a colon cancer screening. It looks like at your last office visit, a FIT test was ordered. If you could pick this up at our lab, complete it, and mail it back to the clinic, that would be greatly appreciated.     If you have any questions, or need any assistance, please contact the clinic at [phone].     Thank you for choosing [facility] as your preferred healthcare.      Sincerely,     Stephany Ferrer, CNP, RN

## 2017-05-04 NOTE — TELEPHONE ENCOUNTER
Scheduled surgery for Right carpal tunnel release on 5/24/2017 with Dr. Darryl Ledesma @ [facility] @ 12:30.  Surgery education packet provided to patient.

## 2017-05-11 NOTE — TELEPHONE ENCOUNTER
Second attempt to contact patient.    Called pt and LM to return call to clinic.   Leydi Cobb MA

## 2017-05-24 ENCOUNTER — TRANSFERRED RECORDS (OUTPATIENT)
Dept: HEALTH INFORMATION MANAGEMENT | Facility: CLINIC | Age: 52
End: 2017-05-24

## 2017-05-31 ENCOUNTER — TELEPHONE (OUTPATIENT)
Dept: ORTHOPEDICS | Facility: CLINIC | Age: 52
End: 2017-05-31

## 2017-05-31 NOTE — TELEPHONE ENCOUNTER
Reason for Call: Patient calls with questions following Carpal Tunnel Release surgery on 5/24/2017 by Dr. Darryl Ledesma. Patient returned to work yesterday, (sits at desk, does lots of typing) and notes increased pain today. Patient has not removed dressing yet due to having a puppy at home so unable to assess wound healing. Patient will do check incision and call back if any red flags for infection. Patient will try to do some elevation of hand during the day. She will also try alternating Ibu and Tylenol per bottle.     She will call back if this does not help or if there is excessive redness, warmth, oozing or concerns after looking at the incision.   No action needed by physician.    Irma Molina, ATC

## 2017-06-05 ENCOUNTER — OFFICE VISIT (OUTPATIENT)
Dept: ORTHOPEDICS | Facility: CLINIC | Age: 52
End: 2017-06-05
Payer: COMMERCIAL

## 2017-06-05 DIAGNOSIS — Z47.89 ORTHOPEDIC AFTERCARE: Primary | ICD-10-CM

## 2017-06-05 PROCEDURE — 99024 POSTOP FOLLOW-UP VISIT: CPT | Performed by: PHYSICIAN ASSISTANT

## 2017-06-06 NOTE — PROGRESS NOTES
HISTORY OF PRESENT ILLNESS:    Merlene Mace is a 51 year old female who is seen in follow up for R CTR, DOS 5/24/17, Dr. Ledesma- Carpal tunnel release.  Present symptoms: Pt reports significant improvement to CT symptoms.  Is keeping covered. Pt is using hand for activities, returning to work.  Pain if bumps hard. No new complaints.  Denies Chest pain, Calve pain, Fever, Chills.    Current Treatment: Postop.    PHYSICAL EXAM:  There were no vitals taken for this visit.  There is no weight on file to calculate BMI.   GENERAL APPEARANCE: healthy, alert and no distress   PSYCH: mentation appears normal and affect normal/bright    MSK:  Right:  Volar wrist.  Incision clean and dry, Sutures present, healing.  No incisional erythema.   No Ecchymosis.  Edema min at wrist hand and digits.  CMS: rani incisional numbness, otherwise grossly intact to digits.  AROM: mild restriction in flexion, otherwise WNL.      ASSESSMENT:  Merlene Mace is a 51 year old female  S/P R CTR, DOS 5/24/17, Dr. Ledesma CTR.  CTS improvement. Healing.  We discussed that CT symptoms may improve for several months after surgery.    PLAN:  - Surgery discussed, images reviewed if applicable, and all questions were answered at this time.  - Sutures removed with sterile technique, steri-strips applied in usual fashion, care instructions given and verbally acknowledged.  - Medications: Taper RX pain meds, OTC PRN.  - Physical Therapy: As instructed / RICE and PROM.  - AAT    Return to clinic PRN.    Ricardo Ramírez PA-C    Dept. Orthopedic Surgery  [facility]     6/5/2017

## 2017-06-13 ENCOUNTER — TELEPHONE (OUTPATIENT)
Dept: ORTHOPEDICS | Facility: CLINIC | Age: 52
End: 2017-06-13

## 2017-06-13 NOTE — TELEPHONE ENCOUNTER
Pt called concerned she may be developing an infection around her incision.  Pt states area is red, feels warm to touch, states she has noticed some puss coming out of it as well.  Pt states pain started last night, has a burning sensation present with it.    Recommended soap and water rinses over the incision area and follow up in clinic, pt is now scheduled for Thursday with David Ramírez.

## 2017-06-15 ENCOUNTER — OFFICE VISIT (OUTPATIENT)
Dept: ORTHOPEDICS | Facility: CLINIC | Age: 52
End: 2017-06-15

## 2017-06-15 DIAGNOSIS — Z47.89 ORTHOPEDIC AFTERCARE: Primary | ICD-10-CM

## 2017-06-15 PROCEDURE — 99024 POSTOP FOLLOW-UP VISIT: CPT | Performed by: PHYSICIAN ASSISTANT

## 2017-06-15 NOTE — PATIENT INSTRUCTIONS
Monitor incision for signs of infection and return if needed.,    Non adhesive dressing as provided for 1-2 weeks.

## 2017-06-15 NOTE — PROGRESS NOTES
HISTORY OF PRESENT ILLNESS:    Merlene Mace is a 51 year old female who is seen in follow up for R CTR, DOS 5/24/17, Dr. Ledesma- Carpal tunnel release.- Carpal tunnel release.  Present symptoms: Pt reports CT symptoms gone.  Is keeping covered. Pt is using hand for light activities.   Reports noticing redness at palm on Monday that was sore and one area of yellow crust at distal incision.  Appears to be gone today.  Does note issues with bandaid reaction at skin.    Denies Chest pain, Calve pain, Fever, Chills.    Current Treatment: Postop.    PHYSICAL EXAM:  There were no vitals taken for this visit.  There is no weight on file to calculate BMI.   GENERAL APPEARANCE: healthy, alert and no distress   PSYCH: mentation appears normal and affect normal/bright    MSK:  Right: Volar wrist.  Incision clean and dry, some scab, but healing.  Area proximal to incision with band aid line reaction and some skin breakdown.  Blancheable, non painful, minor incisional erythema.   No Ecchymosis.  Edema min at wrist, hand and digits.  CMS: rain incisional numbness, otherwise grossly intact to digits.  AROM: mild restriction in wrist flexion, otherwise WNL.      ASSESSMENT:  Merlene Mace is a 51 year old female  S/P R CTR, DOS 5/24/17, Dr. Ledesma- Carpal tunnel release. CTR.  No signs of infection.  Probable skin reaction vs mild cellulits, but resolved. Healing.    PLAN:  - Care instructions given and verbally acknowledged.  - non adhesive incision coverage such as gauze/cast pad and coban/tape for 1-2 weeks.  - monitor for signs of infection.    Return to clinic PRN.    Ricardo Ramírez PA-C    Dept. Orthopedic Surgery  [facility]     6/15/2017

## 2017-06-15 NOTE — MR AVS SNAPSHOT
After Visit Summary   6/15/2017    Merlene Mace    MRN: [id number]           Patient Information     Date Of Birth          1965        Visit Information        Provider Department      6/15/2017 10:20 AM Ricardo Ramírez PA-C [facility] ORTHOPEDIC SURGERY        Today's Diagnoses     Orthopedic aftercare    -  1      Care Instructions    Monitor incision for signs of infection and return if needed.,    Non adhesive dressing as provided for 1-2 weeks.          Follow-ups after your visit        Follow-up notes from your care team     Return if symptoms worsen or fail to improve.      Who to contact     If you have questions or need follow up information about today's clinic visit or your schedule please contact [facility] ORTHOPEDIC SURGERY directly at [phone].  Normal or non-critical lab and imaging results will be communicated to you by Taggstarhart, letter or phone within 4 business days after the clinic has received the results. If you do not hear from us within 7 days, please contact the clinic through Taggstarhart or phone. If you have a critical or abnormal lab result, we will notify you by phone as soon as possible.  Submit refill requests through Fix8 or call your pharmacy and they will forward the refill request to us. Please allow 3 business days for your refill to be completed.          Additional Information About Your Visit        MyChart Information     Fix8 gives you secure access to your electronic health record. If you see a primary care provider, you can also send messages to your care team and make appointments. If you have questions, please call your primary care clinic.  If you do not have a primary care provider, please call [phone] and they will assist you.        Care EveryWhere ID     This is your Care EveryWhere ID. This could be used by other organizations to access your [place] medical records  [id number]         Blood Pressure from Last 3  Encounters:   04/17/17 92/72   03/24/17 102/80   03/25/16 100/70    Weight from Last 3 Encounters:   04/17/17 136 lb (61.7 kg)   03/24/17 136 lb 1.6 oz (61.7 kg)   03/25/16 143 lb (64.9 kg)              Today, you had the following     No orders found for display       Primary Care Provider Office Phone # Fax #    KIRT Tony [phone]       [facility] [address] [facility] [address]        Thank you!     Thank you for choosing [facility] ORTHOPEDIC SURGERY  for your care. Our goal is always to provide you with excellent care. Hearing back from our patients is one way we can continue to improve our services. Please take a few minutes to complete the written survey that you may receive in the mail after your visit with us. Thank you!             Your Updated Medication List - Protect others around you: Learn how to safely use, store and throw away your medicines at www.disposemymeds.org.          This list is accurate as of: 6/15/17 10:47 AM.  Always use your most recent med list.                   Brand Name Dispense Instructions for use    cetirizine 10 MG tablet    zyrTEC    90 tablet    Take 1 tablet (10 mg) by mouth every evening       fluticasone 50 MCG/ACT spray    FLONASE    48 g    Spray 1-2 sprays into both nostrils daily       meclizine 25 MG tablet    ANTIVERT    90 tablet    Take 1 tablet (25 mg) by mouth every 6 hours as needed for dizziness       SUMAtriptan 20 MG/ACT nasal spray    IMITREX    1 each    Spray 1 spray in nostril as needed for migraine May repeat in 2 hours. Max 2 sprays/24 hours.

## 2017-08-08 ENCOUNTER — TELEPHONE (OUTPATIENT)
Dept: FAMILY MEDICINE | Facility: CLINIC | Age: 52
End: 2017-08-08

## 2017-08-08 NOTE — LETTER
August 22, 2017      Merlenesergio Mace  [address] [place] [address]        Dear Luba,     Our records show that you are currently due for a screening for colon cancer. If you would like to complete a colonoscopy please call [phone]. If you would like to complete the stool sample option (FIT Test) please call our clinic at [phone] and we would be happy to assist you.     If you have already had this completed, please contact our clinic so that we can update your chart.     If you have any further questions or concerns please feel free to contact us via [email or web address] or by calling our clinic at [phone].     Thank you for choosing [facility] as your preferred healthcare.     Sincerely,     Stephany Ferrer, CNP, RN

## 2017-08-08 NOTE — TELEPHONE ENCOUNTER
Panel Management Review      Patient has the following on her problem list: None      Composite cancer screening  Chart review shows that this patient is due/due soon for the following Fecal Colorectal (FIT)  Summary:    Patient is due/failing the following:   FIT    Action needed:   Patient needs to complete FIT test.     Type of outreach:    Phone, left message for patient to call back.     Questions for provider review:    None                                                                                                                                    Leydi Cobb MA

## 2017-08-15 NOTE — TELEPHONE ENCOUNTER
Second attempt to contact pt, called pt and LM to return call to clinic regarding FIT test.   Leydi Cobb MA

## 2017-09-07 ENCOUNTER — TELEPHONE (OUTPATIENT)
Dept: ORTHOPEDICS | Facility: CLINIC | Age: 52
End: 2017-09-07

## 2017-09-07 NOTE — TELEPHONE ENCOUNTER
Scheduled surgery for Left Carpal Tunnel Release on 10/04/2017 with Dr. Ledesma @ [facility] @ 12:00.  Surgery education packet provided to patient.

## 2017-10-04 ENCOUNTER — TRANSFERRED RECORDS (OUTPATIENT)
Dept: HEALTH INFORMATION MANAGEMENT | Facility: CLINIC | Age: 52
End: 2017-10-04

## 2017-10-16 ENCOUNTER — OFFICE VISIT (OUTPATIENT)
Dept: ORTHOPEDICS | Facility: CLINIC | Age: 52
End: 2017-10-16
Payer: COMMERCIAL

## 2017-10-16 DIAGNOSIS — Z47.89 ORTHOPEDIC AFTERCARE: Primary | ICD-10-CM

## 2017-10-16 PROCEDURE — 99024 POSTOP FOLLOW-UP VISIT: CPT | Performed by: PHYSICIAN ASSISTANT

## 2017-10-17 NOTE — PROGRESS NOTES
HISTORY OF PRESENT ILLNESS:    Merlene Mace is a 52 year old female who is seen in follow up for Left CTR, DOS 10/04/17, Dr. Ledesma- Carpal tunnel release.  Present symptoms: Pt reports total improvement to CT symptoms.  Is keeping covered. Pt is using hand for activities. No new complaints.  Denies Chest pain, Calve pain, Fever, Chills.    Current Treatment: Postop.    PHYSICAL EXAM:  There were no vitals taken for this visit.  There is no weight on file to calculate BMI.   GENERAL APPEARANCE: healthy, alert and no distress   PSYCH: mentation appears normal and affect normal/bright    MSK:  Left:  Volar wrist.  Incision clean and dry, Sutures present, healing.  no incisional erythema.   No Ecchymosis.  Edema mild at wrist, hand and digits.  CMS: rain incisional numbness, otherwise grossly intact to digits.  AROM: mild restriction in palmar wrist flexion, otherwise WNL.      ASSESSMENT:  Merlene Mace is a 52 year old female  S/P Left CTR, DOS 10/04/17, Dr. Ledesma- CTR.    CT symptoms improvement. Healing.  We discussed that CT symptoms may improve for several months after surgery.    PLAN:  - Surgery discussed, images reviewed if applicable, and all questions were answered at this time.  - Sutures removed with sterile technique, steri-strips applied in usual fashion, care instructions given and verbally acknowledged.  - Medications: Taper RX pain meds, OTC PRN.  - Physical Therapy: As instructed / RICE and PROM.  - AAT    Return to clinic PRN.    Ricardo Ramírez PA-C    Dept. Orthopedic Surgery  [facility]     10/16/2017

## 2017-11-08 ENCOUNTER — TELEPHONE (OUTPATIENT)
Dept: FAMILY MEDICINE | Facility: CLINIC | Age: 52
End: 2017-11-08

## 2017-11-08 NOTE — TELEPHONE ENCOUNTER
Panel Management Review      Patient has the following on her problem list: None      Composite cancer screening  Chart review shows that this patient is due/due soon for the following Colonoscopy  Summary:    Patient is due/failing the following:   COLONOSCOPY    Action needed:   Patient needs to complete a FIT test or a Colonoscopy.     Type of outreach:    Sent [employer] message.    Questions for provider review:    None                                                                                                                                    Leydi Cobb MA

## 2017-11-10 NOTE — TELEPHONE ENCOUNTER
Patient replied via [employer] stating that she will not be doing colon cancer screening.   Leydi Cobb MA

## 2018-02-22 ENCOUNTER — TELEPHONE (OUTPATIENT)
Dept: FAMILY MEDICINE | Facility: CLINIC | Age: 53
End: 2018-02-22

## 2018-02-22 NOTE — TELEPHONE ENCOUNTER
Panel Management Review      Patient has the following on her problem list: None      Composite cancer screening  Chart review shows that this patient is due/due soon for the following Colonoscopy  Summary:    Patient is due/failing the following:   COLONOSCOPY    Action needed:   Patient needs referral/order: Colonoscopy    Type of outreach:    Patient has yearly exam on 3/23.    Questions for provider review:    None                                                                                                                                    Jennifer Mace CMA       Chart routed to closed encounter.

## 2018-03-30 ENCOUNTER — OFFICE VISIT (OUTPATIENT)
Dept: FAMILY MEDICINE | Facility: CLINIC | Age: 53
End: 2018-03-30
Payer: COMMERCIAL

## 2018-03-30 VITALS
BODY MASS INDEX: 21.6 KG/M2 | TEMPERATURE: 98.1 F | OXYGEN SATURATION: 96 % | SYSTOLIC BLOOD PRESSURE: 104 MMHG | RESPIRATION RATE: 14 BRPM | WEIGHT: 126.5 LBS | DIASTOLIC BLOOD PRESSURE: 76 MMHG | HEART RATE: 79 BPM | HEIGHT: 64 IN

## 2018-03-30 DIAGNOSIS — Z13.1 SCREENING FOR DIABETES MELLITUS: ICD-10-CM

## 2018-03-30 DIAGNOSIS — J32.0 CHRONIC MAXILLARY SINUSITIS: ICD-10-CM

## 2018-03-30 DIAGNOSIS — Z12.31 ENCOUNTER FOR SCREENING MAMMOGRAM FOR BREAST CANCER: ICD-10-CM

## 2018-03-30 DIAGNOSIS — J30.1 ACUTE SEASONAL ALLERGIC RHINITIS DUE TO POLLEN: ICD-10-CM

## 2018-03-30 DIAGNOSIS — Z12.11 SCREENING FOR COLON CANCER: Primary | ICD-10-CM

## 2018-03-30 DIAGNOSIS — Z11.59 NEED FOR HEPATITIS C SCREENING TEST: ICD-10-CM

## 2018-03-30 DIAGNOSIS — Z13.220 SCREENING CHOLESTEROL LEVEL: ICD-10-CM

## 2018-03-30 DIAGNOSIS — R42 VERTIGO: ICD-10-CM

## 2018-03-30 DIAGNOSIS — G43.101 MIGRAINE WITH AURA AND WITH STATUS MIGRAINOSUS, NOT INTRACTABLE: ICD-10-CM

## 2018-03-30 PROCEDURE — 99396 PREV VISIT EST AGE 40-64: CPT | Performed by: NURSE PRACTITIONER

## 2018-03-30 RX ORDER — SUMATRIPTAN 100 MG/1
TABLET, FILM COATED ORAL
Qty: 18 TABLET | Refills: 3 | Status: SHIPPED | OUTPATIENT
Start: 2018-03-30 | End: 2019-04-19

## 2018-03-30 RX ORDER — MECLIZINE HYDROCHLORIDE 25 MG/1
25 TABLET ORAL EVERY 6 HOURS PRN
Qty: 30 TABLET | Refills: 1 | Status: SHIPPED | OUTPATIENT
Start: 2018-03-30 | End: 2019-04-19

## 2018-03-30 RX ORDER — MECLIZINE HYDROCHLORIDE 25 MG/1
25 TABLET ORAL EVERY 6 HOURS PRN
Qty: 90 TABLET | Refills: 3 | Status: CANCELLED | OUTPATIENT
Start: 2018-03-30

## 2018-03-30 RX ORDER — TRIAMCINOLONE ACETONIDE 55 UG/1
2 SPRAY, METERED NASAL DAILY
Qty: 1 BOTTLE | Refills: 0 | COMMUNITY
Start: 2018-03-30

## 2018-03-30 RX ORDER — CETIRIZINE HYDROCHLORIDE 10 MG/1
10 TABLET ORAL EVERY EVENING
Qty: 90 TABLET | Refills: 3 | Status: SHIPPED | OUTPATIENT
Start: 2018-03-30 | End: 2020-03-13

## 2018-03-30 ASSESSMENT — ENCOUNTER SYMPTOMS
FEVER: 0
DIARRHEA: 0
CONSTIPATION: 0
EYE PAIN: 0
ABDOMINAL PAIN: 0
DIZZINESS: 0
CHILLS: 0
COUGH: 0
FREQUENCY: 0
HEMATURIA: 0
HEMATOCHEZIA: 0
NERVOUS/ANXIOUS: 0

## 2018-03-30 NOTE — PROGRESS NOTES
"   SUBJECTIVE:   CC: Merlene Mace is an 52 year old woman who presents for preventive health visit.     Physical   Annual:     Getting at least 3 servings of Calcium per day::  Yes    Bi-annual eye exam::  Yes    Dental care twice a year::  Yes    Sleep apnea or symptoms of sleep apnea::  None    Diet::  Regular (no restrictions)    Frequency of exercise::  6-7 days/week    Duration of exercise::  15-30 minutes    Taking medications regularly::  Yes    Medication side effects::  Not applicable    Additional concerns today::  No            Chronic Sinusitis: Patient started allergy shots in 2015 and discontinued due to other life priorities. Notes she would like to start these again soon. Current use of Zyrtec and Nasacort, because she states Flonase did not work for her.   Overall allergies worsening but not acutely worsened today.    Dizziness: Patient uses meclizine prn, every once in a while.     Hx of Endometriosis: Patient reports \"it's been years\" since she went through menopause. Notes she had labs done last year to confirm menopause. States she is now experiencing pain similar to previous endometriosis. Has had an intermittent sharp right sided abdominal pain for the last month that she notes feels just like the pain she had with endometriosis. Inquires if an increase in work stress may be contributing to this pain. Denies vaginal bleeding. Agrees to follow up if not improved in the next month.  No impact to ADL, no fever, bowel or bladder changes, no appetite changes.  Has lost 10 pounds through intentional dieting    Migraines: Use of Imitrex PO and spray.     Weight: Reports she has lost 10 lbs. States this was partly due to work stress, which she adds is manageable now that she has decided to leave work at 4:30 pm vs. 7 pm.     Wt Readings from Last 3 Encounters:   03/30/18 57.4 kg (126 lb 8 oz)   04/17/17 61.7 kg (136 lb)   03/24/17 61.7 kg (136 lb 1.6 oz)     Today's PHQ-2 Score:   PHQ-2 ( 1999 " [employer]) 3/30/2018   Q1: Little interest or pleasure in doing things 0   Q2: Feeling down, depressed or hopeless 0   PHQ-2 Score 0   Q1: Little interest or pleasure in doing things Not at all   Q2: Feeling down, depressed or hopeless Not at all   PHQ-2 Score 0     Abuse: Current or Past(Physical, Sexual or Emotional)- No  Do you feel safe in your environment - Yes    Social History   Substance Use Topics     Smoking status: Never Smoker     Smokeless tobacco: Never Used     Alcohol use Yes      Comment: occasional, one every few months     Alcohol Use 3/30/2018   If you drink alcohol do you typically have greater than 3 drinks per day OR greater than 7 drinks per week? No     Reviewed orders with patient.  Reviewed health maintenance and updated orders accordingly - Yes  Labs reviewed in [facility]    Patient over age 50, mutual decision to screen reflected in health maintenance.    Pertinent mammograms are reviewed under the imaging tab.  History of abnormal Pap smear: NO - age 30-65 PAP every 5 years with negative HPV co-testing recommended    Tobacco  Allergies  Meds  Problems  Med Hx  Surg Hx  Fam Hx  Soc Hx        Allergies  Problems        Review of Systems   Constitutional: Negative for chills and fever.   HENT: Negative for congestion and ear pain.    Eyes: Negative for pain.   Respiratory: Negative for cough.    Cardiovascular: Negative for chest pain.   Gastrointestinal: Negative for abdominal pain, constipation, diarrhea and hematochezia.   Genitourinary: Negative for frequency, genital sores and hematuria.   Neurological: Negative for dizziness.   Psychiatric/Behavioral: The patient is not nervous/anxious.      This document serves as a record of the services and decisions personally performed and made by Callie Reilly DO. It was created on his/her behalf by Jocelyn Cruz, a trained medical scribe. The creation of this document is based the provider's statements to the medical scribe.  Isaiah Banks  "Anthony 2:23 PM, March 30, 2018    OBJECTIVE:   /76 (BP Location: Right arm, Patient Position: Chair, Cuff Size: Adult Regular)  Pulse 79  Temp 98.1  F (36.7  C) (Oral)  Resp 14  Ht 1.626 m (5' 4\")  Wt 57.4 kg (126 lb 8 oz)  SpO2 96%  BMI 21.71 kg/m2  Physical Exam  GENERAL: healthy, alert and no distress  EYES: Eyes grossly normal to inspection, PERRL and conjunctivae and sclerae normal  HENT: ear canals and TM's normal, nose and mouth without ulcers or lesions  NECK: no adenopathy, no asymmetry, masses, or scars and thyroid normal to palpation  RESP: lungs clear to auscultation - no rales, rhonchi or wheezes  BREAST: breast exam deferred  CV: regular rate and rhythm, normal S1 S2, no S3 or S4, no murmur, click or rub, no peripheral edema and peripheral pulses strong  ABDOMEN: soft, nontender, no hepatosplenomegaly, no masses and bowel sounds normal  MS: no gross musculoskeletal defects noted, no edema  SKIN: no suspicious lesions or rashes  NEURO: Normal strength and tone, mentation intact and speech normal  PSYCH: mentation appears normal, affect normal/bright    ASSESSMENT/PLAN:       ICD-10-CM    1. Screening for colon cancer Z12.11 Lipid panel reflex to direct LDL Fasting   2. Need for hepatitis C screening test Z11.59 Hepatitis C Screen Reflex to HCV RNA Quant and Genotype   3. Vertigo R42    4. Chronic maxillary sinusitis J32.0 cetirizine (ZYRTEC) 10 MG tablet   5. Acute seasonal allergic rhinitis due to pollen J30.1 triamcinolone (NASACORT AQ) 55 MCG/ACT Inhaler     cetirizine (ZYRTEC) 10 MG tablet   6. Screening cholesterol level Z13.220    7. Screening for diabetes mellitus Z13.1 GLUCOSE   8. Encounter for screening mammogram for breast cancer Z12.31 MA Screening Digital Bilateral     Patient Instructions   Routine labs today as well as Hepatitis C screening.     Medications refilled.     Mammogram ordered.    Return to clinic if endometriosis pain continues.     COUNSELING:  Reviewed " "preventive health counseling, as reflected in patient instructions  Special attention given to:        Healthy diet/nutrition       Consider Hep C screening for patients born between 1945 and 1965    Luba reports that she has never smoked. She has never used smokeless tobacco.    Estimated body mass index is 21.71 kg/(m^2) as calculated from the following:    Height as of this encounter: 1.626 m (5' 4\").    Weight as of this encounter: 57.4 kg (126 lb 8 oz).     Counseling Resources:  ATP IV Guidelines  Pooled Cohorts Equation Calculator  Breast Cancer Risk Calculator  FRAX Risk Assessment  ICSI Preventive Guidelines  Dietary Guidelines for Americans, 2010  [employer]'s MyPlate  ASA Prophylaxis  Lung CA Screening    The information in this document, created by the medical scribe for me, accurately reflects the services I personally performed and the decisions made by me. I have reviewed and approved this document for accuracy prior to leaving the patient care area.  2:35 PM, 03/30/18    KIRT Tony [facility] [place]  Answers for HPI/ROS submitted by the patient on 3/30/2018   PHQ-2 Score: 0  "

## 2018-03-30 NOTE — MR AVS SNAPSHOT
After Visit Summary   3/30/2018    Merlene Mace    MRN: [id number]           Patient Information     Date Of Birth          1965        Visit Information        Provider Department      3/30/2018 1:40 PM Stephany Ferrer APRN CNP [facility]        Today's Diagnoses     Screening for colon cancer    -  1    Need for hepatitis C screening test        Vertigo        Chronic maxillary sinusitis        Acute seasonal allergic rhinitis due to pollen        Screening cholesterol level        Screening for diabetes mellitus        Encounter for screening mammogram for breast cancer          Care Instructions    Routine labs today as well as Hepatitis C screening.     Medications refilled.     Mammogram ordered.    Return to clinic if endometriosis pain continues.           Follow-ups after your visit        Follow-up notes from your care team     Return in about 1 year (around 3/30/2019).      Future tests that were ordered for you today     Open Future Orders        Priority Expected Expires Ordered    MA Screening Digital Bilateral Routine  3/30/2019 3/30/2018            Who to contact     If you have questions or need follow up information about today's clinic visit or your schedule please contact [facility] directly at [phone].  Normal or non-critical lab and imaging results will be communicated to you by MyChart, letter or phone within 4 business days after the clinic has received the results. If you do not hear from us within 7 days, please contact the clinic through MyChart or phone. If you have a critical or abnormal lab result, we will notify you by phone as soon as possible.  Submit refill requests through 24h00 or call your pharmacy and they will forward the refill request to us. Please allow 3 business days for your refill to be completed.          Additional Information About Your Visit        Aperto Networkshart Information     24h00 gives you secure access  "to your electronic health record. If you see a primary care provider, you can also send messages to your care team and make appointments. If you have questions, please call your primary care clinic.  If you do not have a primary care provider, please call [phone] and they will assist you.        Care EveryWhere ID     This is your Care EveryWhere ID. This could be used by other organizations to access your [place] medical records  [id number]        Your Vitals Were     Pulse Temperature Respirations Height Pulse Oximetry BMI (Body Mass Index)    79 98.1  F (36.7  C) (Oral) 14 5' 4\" (1.626 m) 96% 21.71 kg/m2       Blood Pressure from Last 3 Encounters:   03/30/18 104/76   04/17/17 92/72   03/24/17 102/80    Weight from Last 3 Encounters:   03/30/18 126 lb 8 oz (57.4 kg)   04/17/17 136 lb (61.7 kg)   03/24/17 136 lb 1.6 oz (61.7 kg)              We Performed the Following     GLUCOSE     Hepatitis C Screen Reflex to HCV RNA Quant and Genotype     Lipid panel reflex to direct LDL Fasting          Today's Medication Changes          These changes are accurate as of 3/30/18  2:33 PM.  If you have any questions, ask your nurse or doctor.               Start taking these medicines.        Dose/Directions    triamcinolone 55 MCG/ACT Inhaler   Commonly known as:  NASACORT AQ   Used for:  Acute seasonal allergic rhinitis due to pollen   Started by:  Stephany Ferrer APRN CNP        Dose:  2 spray   Spray 2 sprays into both nostrils daily   Quantity:  1 Bottle   Refills:  0         Stop taking these medicines if you haven't already. Please contact your care team if you have questions.     fluticasone 50 MCG/ACT spray   Commonly known as:  FLONASE   Stopped by:  Stephany Ferrer APRN CNP                Where to get your medicines      These medications were sent to [facility] [address] [place] [address] - [id number] [facility] [address]     Phone:  [phone]     cetirizine 10 MG tablet       "   Some of these will need a paper prescription and others can be bought over the counter.  Ask your nurse if you have questions.     You don't need a prescription for these medications     triamcinolone 55 MCG/ACT Inhaler                Primary Care Provider Office Phone # Fax #    KIRT Tony [phone]       [facility] [address] [facility]  [facility] [address]        Equal Access to Services     LESTER DUMONT : Hadii aad ku hadasho Soomaali, waaxda luqadaha, qaybta kaalmada adeegyada, waxay idiin hayaan adeeg kharash la'divya . So [facility] [phone].    ATENCIÓN: Si habla español, tiene a zhou disposición servicios gratuitos de asistencia lingüística. Xiomara al [phone].    We comply with applicable federal civil rights laws and [place] laws. We do not discriminate on the basis of race, color, national origin, age, disability, sex, sexual orientation, or gender identity.            Thank you!     Thank you for choosing [facility]  for your care. Our goal is always to provide you with excellent care. Hearing back from our patients is one way we can continue to improve our services. Please take a few minutes to complete the written survey that you may receive in the mail after your visit with us. Thank you!             Your Updated Medication List - Protect others around you: Learn how to safely use, store and throw away your medicines at www.disposemymeds.org.          This list is accurate as of 3/30/18  2:33 PM.  Always use your most recent med list.                   Brand Name Dispense Instructions for use Diagnosis    cetirizine 10 MG tablet    zyrTEC    90 tablet    Take 1 tablet (10 mg) by mouth every evening    Chronic maxillary sinusitis, Acute seasonal allergic rhinitis due to pollen       meclizine 25 MG tablet    ANTIVERT    90 tablet    Take 1 tablet (25 mg) by mouth every 6 hours as needed for dizziness    Vertigo       SUMAtriptan 20 MG/ACT  nasal spray    IMITREX    1 each    Spray 1 spray in nostril as needed for migraine May repeat in 2 hours. Max 2 sprays/24 hours.    Migraine       triamcinolone 55 MCG/ACT Inhaler    NASACORT AQ    1 Bottle    Spray 2 sprays into both nostrils daily    Acute seasonal allergic rhinitis due to pollen

## 2018-03-30 NOTE — PATIENT INSTRUCTIONS
Routine labs today as well as Hepatitis C screening.     Medications refilled.     Mammogram ordered.    Return to clinic if endometriosis pain continues.

## 2018-06-23 ENCOUNTER — HEALTH MAINTENANCE LETTER (OUTPATIENT)
Age: 53
End: 2018-06-23

## 2018-07-05 ENCOUNTER — TELEPHONE (OUTPATIENT)
Dept: FAMILY MEDICINE | Facility: CLINIC | Age: 53
End: 2018-07-05

## 2018-07-05 ENCOUNTER — MYC MEDICAL ADVICE (OUTPATIENT)
Dept: FAMILY MEDICINE | Facility: CLINIC | Age: 53
End: 2018-07-05

## 2018-07-05 NOTE — TELEPHONE ENCOUNTER
Panel Management Review      Patient has the following on her problem list: None      Composite cancer screening  Chart review shows that this patient is due/due soon for the following Mammogram and Colonoscopy  Summary:    Patient is due/failing the following:   COLONOSCOPY and MAMMOGRAM    Action needed:   Colonoscopy and Mammogram to be completed    Type of outreach:    Sent [employer] message.    Questions for provider review:    None                                                                                                                                    Luba PAULA M.A.       Chart routed to Care Team .      8/10/18  3rd attempt- Letter sent.  Luba PAULA M.A.

## 2018-07-05 NOTE — LETTER
August 10, 2018      Merlene Mace  [address] [place] [address]        Fidel Verduzco,       We care about your health and have reviewed your health plan including your medical conditions, medications, and lab results.  Based on this review, it is recommended that you follow up regarding the following health topic(s):  -Breast Cancer Screening  -Colon Cancer Screening    We recommend you take the following action(s):  -schedule a MAMMOGRAM which is due. Please disregard this reminder if you have had this exam elsewhere within the last 1-2 years please let us know so we can update your records.  -schedule a COLONOSCOPY to look for colon cancer (due every 10 years or 5 years in higher risk situations.)  Colonoscopies can prevent 90-95% of colon cancer deaths.  Problem lesions can be removed before they ever become cancer.  If you do not wish to do a colonoscopy or cannot afford to do one at this time, there is another option called a Fecal Immunochemical Occult Blood Test (FIT) a take home stool sample kit.  It does not replace the colonoscopy for colorectal cancer screening, but it can detect hidden bleeding in the lower colon.  It does need to be repeated every year and if a positive result is obtained, you would be referred for a colonoscopy.  If you have completed either one of these tests at another facility, please have the records sent to our clinic for our records.     Please call us at the [facility] - [phone] (or use "[employer]") to address the above recommendations.     Thank you for trusting [facility] and we appreciate the opportunity to serve you.  We look forward to supporting your healthcare needs in the future.    Healthy Regards,    Your Health Care Team- Luba PAULA M.A. For Lil[facility] Fer[facility]

## 2018-08-21 ENCOUNTER — TELEPHONE (OUTPATIENT)
Dept: FAMILY MEDICINE | Facility: CLINIC | Age: 53
End: 2018-08-21

## 2018-08-21 NOTE — TELEPHONE ENCOUNTER
Patient noted yesterday morning she had itching on chest and arms. Today she developed hives (little generalized rash) up her arms and now itching is going down her legs. Everything below the neck is itching and/or hives. Nothing has changed, no new bath products, cleaning supplies, medications, herbals, otc's.     Took two Benadryl today and rash is not as bad, helped with itching, but overall, still present and itching.     Advised home remedies to help with itching, continue with Benadryl (advised it can make her sleepy). Be seen immediately if difficulty swallowing or breathing. Continue to monitor, if no better or worse or rash changes in appearance in next 24-48 hours, can be seen in clinic. Patient stated understanding and is in agreement with plan.    Sharon COSTELLO Triage RN

## 2018-08-23 ENCOUNTER — OFFICE VISIT (OUTPATIENT)
Dept: PEDIATRICS | Facility: CLINIC | Age: 53
End: 2018-08-23
Payer: COMMERCIAL

## 2018-08-23 ENCOUNTER — TELEPHONE (OUTPATIENT)
Dept: PEDIATRICS | Facility: CLINIC | Age: 53
End: 2018-08-23

## 2018-08-23 VITALS
TEMPERATURE: 98 F | OXYGEN SATURATION: 99 % | WEIGHT: 130 LBS | DIASTOLIC BLOOD PRESSURE: 60 MMHG | HEART RATE: 77 BPM | BODY MASS INDEX: 22.31 KG/M2 | SYSTOLIC BLOOD PRESSURE: 102 MMHG

## 2018-08-23 DIAGNOSIS — L50.9 URTICARIA: Primary | ICD-10-CM

## 2018-08-23 PROCEDURE — 99212 OFFICE O/P EST SF 10 MIN: CPT | Performed by: INTERNAL MEDICINE

## 2018-08-23 NOTE — PATIENT INSTRUCTIONS
Patient Education    Cetirizine Hydrochloride Chewable tablet    Cetirizine Hydrochloride Oral capsule, liquid filled    Cetirizine Hydrochloride Oral disintegrating tablet    Cetirizine Hydrochloride Oral syrup    Cetirizine Hydrochloride Oral tablet  Cetirizine Hydrochloride Oral tablet  What is this medicine?  CETIRIZINE (se TI ra florencio) is an antihistamine. This medicine is used to treat or prevent symptoms of allergies. It is also used to help reduce itchy skin rash and hives.  This medicine may be used for other purposes; ask your health care provider or pharmacist if you have questions.  What should I tell my health care provider before I take this medicine?  They need to know if you have any of these conditions:    kidney disease    liver disease    an unusual or allergic reaction to cetirizine, hydroxyzine, other medicines, foods, dyes, or preservatives    pregnant or trying to get pregnant    breast-feeding  How should I use this medicine?  Take this medicine by mouth with a glass of water. Follow the directions on the prescription label. You can take this medicine with food or on an empty stomach. Take your medicine at regular times. Do not take more often than directed. You may need to take this medicine for several days before your symptoms improve.  Talk to your pediatrician regarding the use of this medicine in children. Special care may be needed. While this drug may be prescribed for children as young as 6 years of age for selected conditions, precautions do apply.  Overdosage: If you think you have taken too much of this medicine contact a poison control center or emergency room at once.  NOTE: This medicine is only for you. Do not share this medicine with others.  What if I miss a dose?  If you miss a dose, take it as soon as you can. If it is almost time for your next dose, take only that dose. Do not take double or extra doses.  What may interact with this medicine?    alcohol    certain  medicines for anxiety or sleep    narcotic medicines for pain    other medicines for colds or allergies  This list may not describe all possible interactions. Give your health care provider a list of all the medicines, herbs, non-prescription drugs, or dietary supplements you use. Also tell them if you smoke, drink alcohol, or use illegal drugs. Some items may interact with your medicine.  What should I watch for while using this medicine?  Visit your doctor or health care professional for regular checks on your health. Tell your doctor if your symptoms do not improve.  You may get drowsy or dizzy. Do not drive, use machinery, or do anything that needs mental alertness until you know how this medicine affects you. Do not stand or sit up quickly, especially if you are an older patient. This reduces the risk of dizzy or fainting spells.   Your mouth may get dry. Chewing sugarless gum or sucking hard candy, and drinking plenty of water may help. Contact your doctor if the problem does not go away or is severe.  What side effects may I notice from receiving this medicine?  Side effects that you should report to your doctor or health care professional as soon as possible:    allergic reactions like skin rash, itching or hives, swelling of the face, lips, or tongue    changes in vision or hearing    fast or irregular heartbeat    trouble passing urine or change in the amount of urine  Side effects that usually do not require medical attention (report to your doctor or health care professional if they continue or are bothersome):    dizziness    dry mouth    irritability    sore throat    stomach pain    tiredness  This list may not describe all possible side effects. Call your doctor for medical advice about side effects. You may report side effects to FDA at 3-636-FDA-6817.  Where should I keep my medicine?  Keep out of the reach of children.  Store at room temperature between 15 and 30 degrees C (59 and 86 degrees F).  Throw away any unused medicine after the expiration date.  NOTE:This sheet is a summary. It may not cover all possible information. If you have questions about this medicine, talk to your doctor, pharmacist, or health care provider. Copyright  2016 Gold Standard        Hives (Adult)  Hives are pink or red bumps on the skin. These bumps are also known as wheals. The bumps can itch, burn, or sting. Hives can occur anywhere on the body. They vary in size and shape and can form in clusters. Individual hives can appear and go away quickly. New hives may develop as old ones fade. Hives are common and usually harmless. Occasionally hives are a sign of a serious allergy.  Hives are often caused by an allergic reaction. It may be an allergic reaction to foods such as fruit, shellfish, chocolate, nuts, or tomatoes. It may be a reaction to pollens, animal fur, or mold spores. Medicines, chemicals, and insect bites can also cause hives. And hives can be caused by hot sun or cold air. The cause of hives can be difficult to find.  You may be given medicines to relieve swelling and itching. Follow all instructions when using these medicines. The hives will usually fade in a few days, but can last up to 2 weeks.  Home care  Follow these tips:    Try to find the cause of the hives and eliminate it. Discuss possible causes with your healthcare provider. Future reactions to the same allergen may be worse.    Don t scratch the hives. Scratching will delay healing. To reduce itching, apply cool, wet compresses to the skin.    Dress in soft, loose cotton clothing.    Don t bathe in hot water. This can make the itching worse.    Apply an ice pack or cool pack wrapped in a thin towel to your skin. This will help reduce redness and itching. But if your hives were caused by exposure to cold, then do not apply more cold to them.    You may use over-the counter antihistamines to reduce itching. Some older antihistamines, such as  diphenhydramine and chlorpheniramine, are inexpensive. But they need to be taken often and may make you sleepy. They are best used at bedtime. Don t use diphenhydramine if you have glaucoma or have trouble urinating because of an enlarged prostate. Newer antihistamines, such as loratadine, cetirizine, and fexofenadine, are generally more expensive. But they tend to have fewer side effects, such as drowsiness. They can be taken less often.    Another type of antihistamine is used to treat heartburn. This type includes ranitidine, nizatidine, famotidine, and cimetidine. These are sometimes used along with the above antihistamines if a single medicine is not working.  Follow-up care  Follow up with your healthcare provider if your symptoms don't get better in 2 days. Ask your provider about allergy testing if you have had a severe reaction, or have had several episodes of hives. He or she can use the allergy testing to find out what you are allergic to.  When to seek medical advice  Call your healthcare provider right away if any of these occur:    Fever of 100.4 F (38.0 C) or higher, or as directed by your healthcare provider    Redness, swelling, or pain    Foul-smelling fluid coming from the rash  Call 911  Call 911 if any of the following occur:    Swelling of the face, throat, or tongue    Trouble breathing or swallowing    Dizziness, weakness, or fainting  Date Last Reviewed: 9/1/2016 2000-2017 [employer] [facility]. [address] [place] [address], [place] [address]. All rights reserved. This information is not intended as a substitute for professional medical care. Always follow your healthcare professional's instructions.

## 2018-08-23 NOTE — TELEPHONE ENCOUNTER
"Huddled with :(this is what he explains)  -  called pt back to discuss, she refused to talk to him & hung up  - pt is sensitive to pepper & other similar plant, had urticaria with exposure in the past  - she was weeding in her garden, exposure to such plants which started the urticaria   - she has been using benadryl  - when pt came to clinic, her hives were almost gone, no new hives/concerns  - so, SDP advised her to use zyrtec prn  - if the hives come back, he advised her to call us back so that we can treat her with prednisone  - SDP states, \"current sx's doesn't require prednisone\"  - pt wasn't happy to hear that, she was rude to the provider while she was in the clinic  - she doesn't need the f/u appointment with pcp tomorrow, but its totally up to her - as per SDP    Please convey the message to pt. Thanks.    Tim, RN  Triage Nurse          "

## 2018-08-23 NOTE — PROGRESS NOTES
SUBJECTIVE:   Merlene Mace is a 53 year old female who presents to clinic today for the following health issues:      Rash  Onset: 4 days    Description:   Location: started on chest, moved to arms and legs  Character: round, raised, red  Itching (Pruritis): YES    Progression of Symptoms:  worsening    Accompanying Signs & Symptoms:  Fever: no   Body aches or joint pain: no   Sore throat symptoms: no   Recent cold symptoms: no     History:   Previous similar rash: no     Precipitating factors:   Exposure to similar rash: no   New exposures: None   Recent travel: no     Alleviating factors:      Therapies Tried and outcome: benadryl      HPI: Patient has a long history of urticaria especially after exposure to peppers or weeds.  On Sunday was outside removing weeds and on Monday developed urticaria.  She called the nurse line and started taking antihistamines.  Today the urticaria have resolved but still has itching.  Has been using various skin lotions some with Benadryl. (Voice recognition error corrected on August 30, 2018)    PMH:   Patient Active Problem List   Diagnosis     Dysfunction of eustachian tube     Migraine     Chronic maxillary sinusitis     Seasonal allergies     Past Surgical History:   Procedure Laterality Date     CL [facility] SURGICAL PATHOLOGY  1993     RELEASE CARPAL TUNNEL Left 10/04/2017    Procedure:  Left carpal tunnel release.  Surgeon:  Darryl Ledesma MD  Location: [facility] [place]     RELEASE CARPAL TUNNEL Right 05/24/2017    Procedure:  Right carpal tunnel release. Surgeon:  Darryl Ledesma MD  Location: [facility] [place]       Social History   Substance Use Topics     Smoking status: Never Smoker     Smokeless tobacco: Never Used     Alcohol use Yes      Comment: occasional, one every few months     Family History   Problem Relation Age of Onset     Family History Negative Mother      alive age 58     Family History Negative Father      alive age 60     Cancer Maternal  Grandmother      brain         Current Outpatient Prescriptions   Medication Sig Dispense Refill     cetirizine (ZYRTEC) 10 MG tablet Take 1 tablet (10 mg) by mouth every evening 90 tablet 3     meclizine (ANTIVERT) 25 MG tablet Take 1 tablet (25 mg) by mouth every 6 hours as needed for dizziness 30 tablet 1     SUMAtriptan (IMITREX) 100 MG tablet May repeat in 2 hours if needed: max 2/day; Do not use with sumatriptan nasal spray 18 tablet 3     SUMAtriptan (IMITREX) 20 MG/ACT nasal spray Spray 1 spray in nostril as needed for migraine May repeat in 2 hours. Max 2 sprays/24 hours. 1 each 1     triamcinolone (NASACORT AQ) 55 MCG/ACT Inhaler Spray 2 sprays into both nostrils daily 1 Bottle 0     Allergies   Allergen Reactions     No Known Drug Allergies        ROS:  CONSTITUTIONAL: NEGATIVE for fever, chills, change in weight    OBJECTIVE:                                                    /60 (Cuff Size: Adult Regular)  Pulse 77  Temp 98  F (36.7  C) (Oral)  Wt 130 lb (59 kg)  SpO2 99%  BMI 22.31 kg/m2  Body mass index is 22.31 kg/(m^2).       GENERAL: healthy, alert and no distress  SKIN: no urticaria    Diagnostic Test Results:  none      ASSESSMENT/PLAN:                                                    Urticaria    No urticaria seen on exam today but these certainly may recur.  I told her to stop using Benadryl as it is a sedating antihistamine but continue using the Zyrtec.  I told her to stop using the various skin lotions especially the skin lotion with Benadryl.  Will consider a course of prednisone if the urticaria recurrent.              Will call or return to clinic if worsening or symptoms not improving as discussed.  See Patient Instructions      Daniel Fleming MD  [facility] [place]

## 2018-08-23 NOTE — TELEPHONE ENCOUNTER
"Patient calling concerned with earlier appt with Dr. Fleming. Had rash and hives x 4 days.Was advised to see provider.    Copy of telephone message from today:    \"Called patient back. She states hives are worse, they continue to itch and there is now a burning sensation. She states it is also now moving up her neck. She tried home remedies over last day and a half, as recommended, with no relief. Still no difficulty with breathing or swallowing. No other changes noted.     Advised patient be seen today - options of same-day provider or UC explained. No availability in [facility]. Patient willing to see same-day provider in [place]. Appt made.     Sharon COSTELLO Triage RN\"       Patient stated was told by provider to take zyrtec. Patient had been taking that. Patient stated since she had also taken Benadryl, the provider told her there was nothing left to do.    Continues with symptoms      Please advise if prednisone would be a possibility.    Patient has appt with Liliana LEDESMA Tomorrow and wondering if she should keep it.    Routing message to Dr. Fleming and Triage.    Marli Ahumada RN    "

## 2018-08-23 NOTE — MR AVS SNAPSHOT
After Visit Summary   8/23/2018    Merlene Mace    MRN: [id number]           Patient Information     Date Of Birth          1965        Visit Information        Provider Department      8/23/2018 1:20 PM Daniel Fleming MD [facility] Instructions      Patient Education    Cetirizine Hydrochloride Chewable tablet    Cetirizine Hydrochloride Oral capsule, liquid filled    Cetirizine Hydrochloride Oral disintegrating tablet    Cetirizine Hydrochloride Oral syrup    Cetirizine Hydrochloride Oral tablet  Cetirizine Hydrochloride Oral tablet  What is this medicine?  CETIRIZINE (se TI ra zeen) is an antihistamine. This medicine is used to treat or prevent symptoms of allergies. It is also used to help reduce itchy skin rash and hives.  This medicine may be used for other purposes; ask your health care provider or pharmacist if you have questions.  What should I tell my health care provider before I take this medicine?  They need to know if you have any of these conditions:    kidney disease    liver disease    an unusual or allergic reaction to cetirizine, hydroxyzine, other medicines, foods, dyes, or preservatives    pregnant or trying to get pregnant    breast-feeding  How should I use this medicine?  Take this medicine by mouth with a glass of water. Follow the directions on the prescription label. You can take this medicine with food or on an empty stomach. Take your medicine at regular times. Do not take more often than directed. You may need to take this medicine for several days before your symptoms improve.  Talk to your pediatrician regarding the use of this medicine in children. Special care may be needed. While this drug may be prescribed for children as young as 6 years of age for selected conditions, precautions do apply.  Overdosage: If you think you have taken too much of this medicine contact a poison control center or emergency room at once.  NOTE: This  medicine is only for you. Do not share this medicine with others.  What if I miss a dose?  If you miss a dose, take it as soon as you can. If it is almost time for your next dose, take only that dose. Do not take double or extra doses.  What may interact with this medicine?    alcohol    certain medicines for anxiety or sleep    narcotic medicines for pain    other medicines for colds or allergies  This list may not describe all possible interactions. Give your health care provider a list of all the medicines, herbs, non-prescription drugs, or dietary supplements you use. Also tell them if you smoke, drink alcohol, or use illegal drugs. Some items may interact with your medicine.  What should I watch for while using this medicine?  Visit your doctor or health care professional for regular checks on your health. Tell your doctor if your symptoms do not improve.  You may get drowsy or dizzy. Do not drive, use machinery, or do anything that needs mental alertness until you know how this medicine affects you. Do not stand or sit up quickly, especially if you are an older patient. This reduces the risk of dizzy or fainting spells.   Your mouth may get dry. Chewing sugarless gum or sucking hard candy, and drinking plenty of water may help. Contact your doctor if the problem does not go away or is severe.  What side effects may I notice from receiving this medicine?  Side effects that you should report to your doctor or health care professional as soon as possible:    allergic reactions like skin rash, itching or hives, swelling of the face, lips, or tongue    changes in vision or hearing    fast or irregular heartbeat    trouble passing urine or change in the amount of urine  Side effects that usually do not require medical attention (report to your doctor or health care professional if they continue or are bothersome):    dizziness    dry mouth    irritability    sore throat    stomach pain    tiredness  This list may  not describe all possible side effects. Call your doctor for medical advice about side effects. You may report side effects to FDA at 6-830-WUD-9622.  Where should I keep my medicine?  Keep out of the reach of children.  Store at room temperature between 15 and 30 degrees C (59 and 86 degrees F). Throw away any unused medicine after the expiration date.  NOTE:This sheet is a summary. It may not cover all possible information. If you have questions about this medicine, talk to your doctor, pharmacist, or health care provider. Copyright  2016 Gold Standard        Hives (Adult)  Hives are pink or red bumps on the skin. These bumps are also known as wheals. The bumps can itch, burn, or sting. Hives can occur anywhere on the body. They vary in size and shape and can form in clusters. Individual hives can appear and go away quickly. New hives may develop as old ones fade. Hives are common and usually harmless. Occasionally hives are a sign of a serious allergy.  Hives are often caused by an allergic reaction. It may be an allergic reaction to foods such as fruit, shellfish, chocolate, nuts, or tomatoes. It may be a reaction to pollens, animal fur, or mold spores. Medicines, chemicals, and insect bites can also cause hives. And hives can be caused by hot sun or cold air. The cause of hives can be difficult to find.  You may be given medicines to relieve swelling and itching. Follow all instructions when using these medicines. The hives will usually fade in a few days, but can last up to 2 weeks.  Home care  Follow these tips:    Try to find the cause of the hives and eliminate it. Discuss possible causes with your healthcare provider. Future reactions to the same allergen may be worse.    Don t scratch the hives. Scratching will delay healing. To reduce itching, apply cool, wet compresses to the skin.    Dress in soft, loose cotton clothing.    Don t bathe in hot water. This can make the itching worse.    Apply an ice pack  or cool pack wrapped in a thin towel to your skin. This will help reduce redness and itching. But if your hives were caused by exposure to cold, then do not apply more cold to them.    You may use over-the counter antihistamines to reduce itching. Some older antihistamines, such as diphenhydramine and chlorpheniramine, are inexpensive. But they need to be taken often and may make you sleepy. They are best used at bedtime. Don t use diphenhydramine if you have glaucoma or have trouble urinating because of an enlarged prostate. Newer antihistamines, such as loratadine, cetirizine, and fexofenadine, are generally more expensive. But they tend to have fewer side effects, such as drowsiness. They can be taken less often.    Another type of antihistamine is used to treat heartburn. This type includes ranitidine, nizatidine, famotidine, and cimetidine. These are sometimes used along with the above antihistamines if a single medicine is not working.  Follow-up care  Follow up with your healthcare provider if your symptoms don't get better in 2 days. Ask your provider about allergy testing if you have had a severe reaction, or have had several episodes of hives. He or she can use the allergy testing to find out what you are allergic to.  When to seek medical advice  Call your healthcare provider right away if any of these occur:    Fever of 100.4 F (38.0 C) or higher, or as directed by your healthcare provider    Redness, swelling, or pain    Foul-smelling fluid coming from the rash  Call 911  Call 911 if any of the following occur:    Swelling of the face, throat, or tongue    Trouble breathing or swallowing    Dizziness, weakness, or fainting  Date Last Reviewed: 9/1/2016 2000-2017 [employer]. [address] [place] [address], [place] [address]. All rights reserved. This information is not intended as a substitute for professional medical care. Always follow your healthcare professional's instructions.                 Follow-ups after your visit        Your next 10 appointments already scheduled     Aug 24, 2018  4:00 PM CDT   [facility]    [address] [facility] [address] [phone]              Who to contact     If you have questions or need follow up information about today's clinic visit or your schedule please contact [facility] directly at [phone].  Normal or non-critical lab and imaging results will be communicated to you by Narushart, letter or phone within 4 business days after the clinic has received the results. If you do not hear from us within 7 days, please contact the clinic through Narushart or phone. If you have a critical or abnormal lab result, we will notify you by phone as soon as possible.  Submit refill requests through [employer] or call your pharmacy and they will forward the refill request to us. Please allow 3 business days for your refill to be completed.          Additional Information About Your Visit        NarusharNebuAd Information     [employer] gives you secure access to your electronic health record. If you see a primary care provider, you can also send messages to your care team and make appointments. If you have questions, please call your primary care clinic.  If you do not have a primary care provider, please call [phone] and they will assist you.        Care EveryWhere ID     This is your Care EveryWhere ID. This could be used by other organizations to access your [facility] medical records  [id number]        Your Vitals Were     Pulse Temperature Pulse Oximetry BMI (Body Mass Index)          77 98  F (36.7  C) (Oral) 99% 22.31 kg/m2         Blood Pressure from Last 3 Encounters:   08/23/18 102/60   03/30/18 104/76   04/17/17 92/72    Weight from Last 3 Encounters:   08/23/18 130 lb (59 kg)   03/30/18 126 lb 8 oz (57.4 kg)   04/17/17 136 lb (61.7 kg)              Today, you had  the following     No orders found for display       Primary Care Provider Office Phone # Fax #    KIRT Tony [phone] [address] [address]        Equal Access to Services     LESTER DUMONT : Hadii aad ku hadgeorginao Soomaali, waaxda luqadaha, qaybta kaalmada adeegyada, waxpao idiin hayyashiran adeamie saba lachance lopez. So [facility] [phone].    ATENCIÓN: Si habla español, tiene a zhou disposición servicios gratuitos de asistencia lingüística. Llame al [phone].    We comply with applicable federal civil rights laws and [place] laws. We do not discriminate on the basis of race, color, national origin, age, disability, sex, sexual orientation, or gender identity.            Thank you!     Thank you for choosing [facility]  for your care. Our goal is always to provide you with excellent care. Hearing back from our patients is one way we can continue to improve our services. Please take a few minutes to complete the written survey that you may receive in the mail after your visit with us. Thank you!             Your Updated Medication List - Protect others around you: Learn how to safely use, store and throw away your medicines at www.disposemymeds.org.          This list is accurate as of 8/23/18  1:48 PM.  Always use your most recent med list.                   Brand Name Dispense Instructions for use Diagnosis    cetirizine 10 MG tablet    zyrTEC    90 tablet    Take 1 tablet (10 mg) by mouth every evening    Chronic maxillary sinusitis, Acute seasonal allergic rhinitis due to pollen       meclizine 25 MG tablet    ANTIVERT    30 tablet    Take 1 tablet (25 mg) by mouth every 6 hours as needed for dizziness    Vertigo       SUMAtriptan 100 MG tablet    IMITREX    18 tablet    May repeat in 2 hours if needed: max 2/day; Do not use with sumatriptan nasal spray    Migraine with aura and with status migrainosus, not intractable       SUMAtriptan 20 MG/ACT nasal  spray    IMITREX    1 each    Spray 1 spray in nostril as needed for migraine May repeat in 2 hours. Max 2 sprays/24 hours.    Migraine       triamcinolone 55 MCG/ACT inhaler    NASACORT AQ    1 Bottle    Spray 2 sprays into both nostrils daily    Acute seasonal allergic rhinitis due to pollen

## 2018-08-23 NOTE — TELEPHONE ENCOUNTER
Called patient back. She states hives are worse, they continue to itch and there is now a burning sensation. She states it is also now moving up her neck. She tried home remedies over last day and a half, as recommended, with no relief. Still no difficulty with breathing or swallowing. No other changes noted.    Advised patient be seen today - options of same-day provider or UC explained. No availability in [facility]. Patient willing to see same-day provider in [place]. Appt made.    Sharon COSTELLO, Triage RN

## 2018-08-23 NOTE — TELEPHONE ENCOUNTER
Attempted to relay provider message. Patient restated does take zyrtec everyday. Advised provider did not recommend prednisone at this time as hives were almost gone. Patient wants to keep appointment with pcp tomorrow. Patient did not want to speak further. Said good-bye and hung-up.    aMrli Ahumada RN

## 2018-08-24 ENCOUNTER — OFFICE VISIT (OUTPATIENT)
Dept: FAMILY MEDICINE | Facility: CLINIC | Age: 53
End: 2018-08-24
Payer: COMMERCIAL

## 2018-08-24 VITALS
DIASTOLIC BLOOD PRESSURE: 68 MMHG | TEMPERATURE: 97.5 F | HEART RATE: 85 BPM | WEIGHT: 131.2 LBS | SYSTOLIC BLOOD PRESSURE: 112 MMHG | BODY MASS INDEX: 22.52 KG/M2 | OXYGEN SATURATION: 98 %

## 2018-08-24 DIAGNOSIS — L50.9 HIVES: Primary | ICD-10-CM

## 2018-08-24 PROCEDURE — 99213 OFFICE O/P EST LOW 20 MIN: CPT | Performed by: NURSE PRACTITIONER

## 2018-08-24 RX ORDER — HYDROXYZINE HYDROCHLORIDE 25 MG/1
25-50 TABLET, FILM COATED ORAL EVERY 6 HOURS PRN
Qty: 60 TABLET | Refills: 1 | Status: SHIPPED | OUTPATIENT
Start: 2018-08-24 | End: 2019-04-19

## 2018-08-24 RX ORDER — PREDNISONE 20 MG/1
20 TABLET ORAL DAILY
Qty: 5 TABLET | Refills: 0 | Status: SHIPPED | OUTPATIENT
Start: 2018-08-24 | End: 2019-04-19

## 2018-08-24 NOTE — PROGRESS NOTES
SUBJECTIVE:   Merlene Mace is a 53 year old female who presents to clinic today for the following health issues:      Rash, hives      Duration: x 5 days, constant    Description  Location: whole body  Itching: severe    Intensity:  moderate    Accompanying signs and symptoms: None    History (similar episodes/previous evaluation): None    Precipitating or alleviating factors:  New exposures:  None  Recent travel: no      Therapies tried and outcome: Aveeno lotion, Aveeno body wash, hydrocortisone, benadryl, benadryl cool itching spray, seen 08/23/2018, [place], Sent with [facility] pt feels like the provider was no help, and prescription was not helpful. Wants steroid burst    Patient Active Problem List   Diagnosis     Dysfunction of eustachian tube     Migraine     Chronic maxillary sinusitis     Seasonal allergies     Past Surgical History:   Procedure Laterality Date     CL AFF SURGICAL PATHOLOGY  1993     RELEASE CARPAL TUNNEL Left 10/04/2017    Procedure:  Left carpal tunnel release.  Surgeon:  Darryl Ledesma MD  Location: [facility]     RELEASE CARPAL TUNNEL Right 05/24/2017    Procedure:  Right carpal tunnel release. Surgeon:  Darryl Ledesma MD  Location: [facility]       Social History   Substance Use Topics     Smoking status: Never Smoker     Smokeless tobacco: Never Used     Alcohol use Yes      Comment: occasional, one every few months     Family History   Problem Relation Age of Onset     Family History Negative Mother      alive age 58     Family History Negative Father      alive age 60     Cancer Maternal Grandmother      brain                ROS:  CV: NEGATIVE for chest pain, palpitations or peripheral edema  C: NEGATIVE for fever, chills, change in weight  E/M: NEGATIVE for ear, mouth and throat problems  R: NEGATIVE for significant cough or SOB    OBJECTIVE:                                                    /68 (BP Location: Right arm, Patient Position:  Sitting, Cuff Size: Adult Regular)  Pulse 85  Temp 97.5  F (36.4  C) (Oral)  Wt 131 lb 3.2 oz (59.5 kg)  SpO2 98%  BMI 22.52 kg/m2 Body mass index is 22.52 kg/(m^2).   GENERAL: healthy, alert, well nourished, well hydrated, no distress  RESP: lungs clear to auscultation - no rales, no rhonchi, no wheezes  CV: regular rates and rhythm, normal S1 S2, no S3 or S4 and no murmur, no click or rub -  Skin-- diffuse mild urticaria today       ASSESSMENT/PLAN:                                                        ICD-10-CM    1. Hives L50.9 ranitidine (ZANTAC) 300 MG tablet     hydrOXYzine (ATARAX) 25 MG tablet     predniSONE (DELTASONE) 20 MG tablet     ALLERGY/ASTHMA ADULT REFERRAL           Patient Instructions   Start the zantac and zyrtec.  Use the hydroxyzine tonight and see how you do.  If you have to, go ahead and start the prednisone.  Please see allergy for follow up            KIRT Tony [facility]

## 2018-08-24 NOTE — MR AVS SNAPSHOT
After Visit Summary   8/24/2018    Merlene Mace    MRN: [id number]           Patient Information     Date Of Birth          1965        Visit Information        Provider Department      8/24/2018 4:00 PM Stephany Ferrer APRN CNP [facility]        Today's Diagnoses     Hives    -  1      Care Instructions    Start the zantac and zyrtec.  Use the hydroxyzine tonight and see how you do.  If you have to, go ahead and start the prednisone.  Please see allergy for follow up          Follow-ups after your visit        Additional Services     ALLERGY/ASTHMA ADULT REFERRAL       Your provider has referred you to: [facility]: [facility] [phone]   [email or web address]    Please be aware that coverage of these services is subject to the terms and limitations of your health insurance plan.  Call member services at your health plan with any benefit or coverage questions.      Please bring the following with you to your appointment:    (1) Any X-Rays, CTs or MRIs which have been performed.  Contact the facility where they were done to arrange for  prior to your scheduled appointment.    (2) List of current medications  (3) This referral request   (4) Any documents/labs given to you for this referral                  Follow-up notes from your care team     Return in about 6 months (around 2/24/2019) for Routine Visit.      Who to contact     If you have questions or need follow up information about today's clinic visit or your schedule please contact [facility] directly at [phone].  Normal or non-critical lab and imaging results will be communicated to you by MyChart, letter or phone within 4 business days after the clinic has received the results. If you do not hear from us within 7 days, please contact the clinic through MyChart or phone. If you have a critical or abnormal lab result, we will notify you by phone as soon as  possible.  Submit refill requests through [employer] Network or call your pharmacy and they will forward the refill request to us. Please allow 3 business days for your refill to be completed.          Additional Information About Your Visit        [employer] Networks[employer]Kandu Information     [employer] gives you secure access to your electronic health record. If you see a primary care provider, you can also send messages to your care team and make appointments. If you have questions, please call your primary care clinic.  If you do not have a primary care provider, please call [phone] and they will assist you.        Care EveryWhere ID     This is your Care EveryWhere ID. This could be used by other organizations to access your [place] medical records  [id number]        Your Vitals Were     Pulse Temperature Pulse Oximetry BMI (Body Mass Index)          85 97.5  F (36.4  C) (Oral) 98% 22.52 kg/m2         Blood Pressure from Last 3 Encounters:   08/24/18 112/68   08/23/18 102/60   03/30/18 104/76    Weight from Last 3 Encounters:   08/24/18 131 lb 3.2 oz (59.5 kg)   08/23/18 130 lb (59 kg)   03/30/18 126 lb 8 oz (57.4 kg)              We Performed the Following     ALLERGY/ASTHMA ADULT REFERRAL          Today's Medication Changes          These changes are accurate as of 8/24/18  4:39 PM.  If you have any questions, ask your nurse or doctor.               Start taking these medicines.        Dose/Directions    hydrOXYzine 25 MG tablet   Commonly known as:  ATARAX   Used for:  Hives   Started by:  Stephany Ferrer APRN CNP        Dose:  25-50 mg   Take 1-2 tablets (25-50 mg) by mouth every 6 hours as needed for itching   Quantity:  60 tablet   Refills:  1       predniSONE 20 MG tablet   Commonly known as:  DELTASONE   Used for:  Hives   Started by:  Stephany Ferrer APRN CNP        Dose:  20 mg   Take 1 tablet (20 mg) by mouth daily   Quantity:  5 tablet   Refills:  0       ranitidine 300 MG tablet   Commonly known as:  ZANTAC   Used for:   Hives   Started by:  Stephany Ferrer APRN CNP        Dose:  300 mg   Take 1 tablet (300 mg) by mouth At Bedtime   Quantity:  30 tablet   Refills:  1            Where to get your medicines      These medications were sent to [facility] [address] [place] [address] [address] [facility]  [address] [facility] [address]     Phone:  [phone]     hydrOXYzine 25 MG tablet    predniSONE 20 MG tablet    ranitidine 300 MG tablet                Primary Care Provider Office Phone # Fax #    Stephany KIRT Ferrer [phone]       [address] [facility] [address]        Equal Access to Services     [facility]: Hadii aad elo hadasho Sogustavo, waaxda luqadaha, qaybta kaalmada adeegyada, jhonatan donahuein haydivya montoya . So [facility] [phone].    ATENCIÓN: Si habla español, tiene a zhou disposición servicios gratuitos de asistencia lingüística. [facility] [phone].    We comply with applicable federal civil rights laws and [place] laws. We do not discriminate on the basis of race, color, national origin, age, disability, sex, sexual orientation, or gender identity.            Thank you!     Thank you for choosing [facility]  for your care. Our goal is always to provide you with excellent care. Hearing back from our patients is one way we can continue to improve our services. Please take a few minutes to complete the written survey that you may receive in the mail after your visit with us. Thank you!             Your Updated Medication List - Protect others around you: Learn how to safely use, store and throw away your medicines at www.disposemymeds.org.          This list is accurate as of 8/24/18  4:39 PM.  Always use your most recent med list.                   Brand Name Dispense Instructions for use Diagnosis    cetirizine 10 MG tablet    zyrTEC    90 tablet    Take 1 tablet (10 mg) by mouth every evening    Chronic maxillary sinusitis, Acute seasonal allergic  rhinitis due to pollen       hydrOXYzine 25 MG tablet    ATARAX    60 tablet    Take 1-2 tablets (25-50 mg) by mouth every 6 hours as needed for itching    Hives       meclizine 25 MG tablet    ANTIVERT    30 tablet    Take 1 tablet (25 mg) by mouth every 6 hours as needed for dizziness    Vertigo       predniSONE 20 MG tablet    DELTASONE    5 tablet    Take 1 tablet (20 mg) by mouth daily    Hives       ranitidine 300 MG tablet    ZANTAC    30 tablet    Take 1 tablet (300 mg) by mouth At Bedtime    Hives       SUMAtriptan 100 MG tablet    IMITREX    18 tablet    May repeat in 2 hours if needed: max 2/day; Do not use with sumatriptan nasal spray    Migraine with aura and with status migrainosus, not intractable       SUMAtriptan 20 MG/ACT nasal spray    IMITREX    1 each    Spray 1 spray in nostril as needed for migraine May repeat in 2 hours. Max 2 sprays/24 hours.    Migraine       triamcinolone 55 MCG/ACT inhaler    NASACORT AQ    1 Bottle    Spray 2 sprays into both nostrils daily    Acute seasonal allergic rhinitis due to pollen

## 2018-08-24 NOTE — PATIENT INSTRUCTIONS
Start the zantac and zyrtec.  Use the hydroxyzine tonight and see how you do.  If you have to, go ahead and start the prednisone.  Please see allergy for follow up

## 2018-08-28 ENCOUNTER — TELEPHONE (OUTPATIENT)
Dept: FAMILY MEDICINE | Facility: CLINIC | Age: 53
End: 2018-08-28

## 2018-08-28 NOTE — TELEPHONE ENCOUNTER
Patient calling woke up with some lip swelling this morning. She also has the itching/poking feeling all over, this continues from last week.     She is NOT in respiratory distress nor have respiratory symptoms.     She took a Zantac, Hydroxyzine & Benadryl. Lip swelling has improved.     She is inquiring if she should start the Prednisone. She has an Allergy appointment on 9/12/18.     Advised ok to start Prednisone, warned of side effects, advised to take in AM. This is due to new symptom and continued symptoms.     Patient agrees with plan. She will follow up prn.

## 2018-08-28 NOTE — TELEPHONE ENCOUNTER
Routing to Cheryl as FYI:    Patient states she used the prednisone for her lip swelling and it did help but she is still very itchy everywhere. Taking all medications as prescribed.   Patient is concerned she was only prescribed 5 tablets prednisone.    Has allergy appointment 9/12 and derm appt 9/13.     Call back on cell phone: [phone]. Can leave detailed message if needed.     Will huddle with POD in PCP's absence.    Huddled with Randolph Dailey: Patient should have liver enzymes checked. Should rule out hepatitis, increased bilirubin, gallbladder issues. Patient should take prednisone if lip swelling occurs again. Advises patient be seen in clinic tomorrow.    Called patient to advise as above:   Patient will come in fasting as there are also lipids futured for her. Advised if any difficulty with breathing to be seen immediately.Patient stated understanding and is in agreement with plan.  Next 5 appointments (look out 90 days)     Aug 29, 2018  9:10 AM CDT   Office Visit with Cheryl Becker PA-C   [facility]    [address] [address] [phone]                Sharon COSTELLO, Triage RN

## 2018-08-29 ENCOUNTER — OFFICE VISIT (OUTPATIENT)
Dept: FAMILY MEDICINE | Facility: CLINIC | Age: 53
End: 2018-08-29
Payer: COMMERCIAL

## 2018-08-29 VITALS
DIASTOLIC BLOOD PRESSURE: 76 MMHG | WEIGHT: 130 LBS | OXYGEN SATURATION: 99 % | BODY MASS INDEX: 24.55 KG/M2 | TEMPERATURE: 97.6 F | SYSTOLIC BLOOD PRESSURE: 114 MMHG | HEIGHT: 61 IN | HEART RATE: 76 BPM | RESPIRATION RATE: 16 BRPM

## 2018-08-29 DIAGNOSIS — Z11.59 NEED FOR HEPATITIS C SCREENING TEST: ICD-10-CM

## 2018-08-29 DIAGNOSIS — L50.9 HIVES: ICD-10-CM

## 2018-08-29 DIAGNOSIS — L29.9 ITCHING: Primary | ICD-10-CM

## 2018-08-29 LAB
ERYTHROCYTE [DISTWIDTH] IN BLOOD BY AUTOMATED COUNT: 12.4 % (ref 10–15)
HCT VFR BLD AUTO: 40.1 % (ref 35–47)
HGB BLD-MCNC: 14 G/DL (ref 11.7–15.7)
MCH RBC QN AUTO: 31.2 PG (ref 26.5–33)
MCHC RBC AUTO-ENTMCNC: 34.9 G/DL (ref 31.5–36.5)
MCV RBC AUTO: 89 FL (ref 78–100)
PLATELET # BLD AUTO: 304 10E9/L (ref 150–450)
RBC # BLD AUTO: 4.49 10E12/L (ref 3.8–5.2)
WBC # BLD AUTO: 10 10E9/L (ref 4–11)

## 2018-08-29 PROCEDURE — 85027 COMPLETE CBC AUTOMATED: CPT | Performed by: PHYSICIAN ASSISTANT

## 2018-08-29 PROCEDURE — 99214 OFFICE O/P EST MOD 30 MIN: CPT | Performed by: PHYSICIAN ASSISTANT

## 2018-08-29 PROCEDURE — 86803 HEPATITIS C AB TEST: CPT | Performed by: PHYSICIAN ASSISTANT

## 2018-08-29 PROCEDURE — 80053 COMPREHEN METABOLIC PANEL: CPT | Performed by: PHYSICIAN ASSISTANT

## 2018-08-29 PROCEDURE — 36415 COLL VENOUS BLD VENIPUNCTURE: CPT | Performed by: PHYSICIAN ASSISTANT

## 2018-08-29 PROCEDURE — 80061 LIPID PANEL: CPT | Performed by: PHYSICIAN ASSISTANT

## 2018-08-29 RX ORDER — METHYLPREDNISOLONE 4 MG
TABLET, DOSE PACK ORAL
Qty: 21 TABLET | Refills: 0 | Status: SHIPPED | OUTPATIENT
Start: 2018-08-29 | End: 2019-04-19

## 2018-08-29 NOTE — PROGRESS NOTES
SUBJECTIVE:   Merlene Mace is a 53 year old female who presents to clinic today for the following health issues:      Itching    Patient is here today for evaluation of total body itching  Ongoing since her last office visit on 8/27, had been ongoing for 5 days at this point  She did have one day of hives, but the only other issue is her lips were swollen yesterday  She has tried Zantac, Benadryl, Zyrtec, Hydroxyzine, and Prednisone for this  Prednisone helped with the lip swelling (see 8/28 encounter) but not itchiness  Hydroxyzine helped with the itchiness a little bit but still having mild-mod itching  She does have allergy appointment 9/12 and derm appt 9/13    No new soaps, detergents or shampoos  No recent travel  No bug bites  No trouble breathing or swallowing  Feels well otherwise, eating and drinking well, no cough or cold symptoms    Problem list and histories reviewed & adjusted, as indicated.  Additional history: as documented    Patient Active Problem List   Diagnosis     Dysfunction of eustachian tube     Migraine     Chronic maxillary sinusitis     Seasonal allergies     Past Surgical History:   Procedure Laterality Date     CL AFF SURGICAL PATHOLOGY  1993     RELEASE CARPAL TUNNEL Left 10/04/2017    Procedure:  Left carpal tunnel release.  Surgeon:  Darryl Ledesma MD  Location: [facility]     RELEASE CARPAL TUNNEL Right 05/24/2017    Procedure:  Right carpal tunnel release. Surgeon:  Darryl Ledesma MD  Location: [facility]       Social History   Substance Use Topics     Smoking status: Never Smoker     Smokeless tobacco: Never Used     Alcohol use Yes      Comment: occasional, one every few months     Family History   Problem Relation Age of Onset     Family History Negative Mother      alive age 58     Family History Negative Father      alive age 60     Cancer Maternal Grandmother      brain         Current Outpatient Prescriptions   Medication Sig Dispense  "Refill     cetirizine (ZYRTEC) 10 MG tablet Take 1 tablet (10 mg) by mouth every evening 90 tablet 3     hydrOXYzine (ATARAX) 25 MG tablet Take 1-2 tablets (25-50 mg) by mouth every 6 hours as needed for itching 60 tablet 1     meclizine (ANTIVERT) 25 MG tablet Take 1 tablet (25 mg) by mouth every 6 hours as needed for dizziness 30 tablet 1     methylPREDNISolone (MEDROL DOSEPAK) 4 MG tablet Follow package instructions 21 tablet 0     predniSONE (DELTASONE) 20 MG tablet Take 1 tablet (20 mg) by mouth daily 5 tablet 0     ranitidine (ZANTAC) 300 MG tablet Take 1 tablet (300 mg) by mouth At Bedtime 30 tablet 1     SUMAtriptan (IMITREX) 100 MG tablet May repeat in 2 hours if needed: max 2/day; Do not use with sumatriptan nasal spray 18 tablet 3     SUMAtriptan (IMITREX) 20 MG/ACT nasal spray Spray 1 spray in nostril as needed for migraine May repeat in 2 hours. Max 2 sprays/24 hours. 1 each 1     triamcinolone (NASACORT AQ) 55 MCG/ACT Inhaler Spray 2 sprays into both nostrils daily 1 Bottle 0     Allergies   Allergen Reactions     No Known Drug Allergies        Reviewed and updated as needed this visit by clinical staff  Tobacco  Allergies  Meds  Med Hx  Surg Hx  Fam Hx  Soc Hx      Reviewed and updated as needed this visit by Provider         ROS:  Constitutional, HEENT, cardiovascular, pulmonary, gi and gu systems are negative, except as otherwise noted.    OBJECTIVE:     /76 (BP Location: Right arm, Patient Position: Chair, Cuff Size: Adult Regular)  Pulse 76  Temp 97.6  F (36.4  C) (Oral)  Resp 16  Ht 5' 1\" (1.549 m)  Wt 130 lb (59 kg)  LMP  (LMP Unknown)  SpO2 99%  Breastfeeding? No  BMI 24.56 kg/m2  Body mass index is 24.56 kg/(m^2).  GENERAL: healthy, alert and no distress  NECK: no adenopathy, no asymmetry, masses, or scars and thyroid normal to palpation  RESP: lungs clear to auscultation - no rales, rhonchi or wheezes  CV: regular rate and rhythm, normal S1 S2, no S3 or S4, no murmur, " click or rub, no peripheral edema and peripheral pulses strong  MS: no gross musculoskeletal defects noted, no edema  SKIN: no suspicious lesions or rashes    Diagnostic Test Results:  none     ASSESSMENT/PLAN:             1. Itching  - CBC with platelets  - Comprehensive metabolic panel  - methylPREDNISolone (MEDROL DOSEPAK) 4 MG tablet; Follow package instructions  Dispense: 21 tablet; Refill: 0  - DERMATOLOGY REFERRAL    2. Hives  - methylPREDNISolone (MEDROL DOSEPAK) 4 MG tablet; Follow package instructions  Dispense: 21 tablet; Refill: 0  - DERMATOLOGY REFERRAL    Chronic issue, unclear cause, no obvious trigger. Recommend daily Zantac, Zyrtec and Benadryl.  Will update labs to ensure no liver etiology or CBC etiology.  Recommend Medrol dose malka to see if symptoms improve rather than intermittent Prednisone.  Referral to an alternative dermatologist to see if she can be seen sooner.  F/U if persistent issue. Discussed if trouble breathing or swallowing, tongue swelling should go to ER.     3. Need for hepatitis C screening test  - Hepatitis C Screen Reflex to HCV RNA Quant and Genotype    Risks, benefits and alternatives were discussed with patient. Agreeable to the plan of care.      Cheryl Becker PA-C  [facility]

## 2018-08-29 NOTE — MR AVS SNAPSHOT
After Visit Summary   8/29/2018    Merlene Mace    MRN: [id number]           Patient Information     Date Of Birth          1965        Visit Information        Provider Department      8/29/2018 9:10 AM Cheryl Becker PA-C [facility]        Today's Diagnoses     Need for prophylactic vaccination and inoculation against influenza    -  1    Need for hepatitis C screening test        Itching        Hives           Follow-ups after your visit        Additional Services     DERMATOLOGY REFERRAL       Your provider has referred you to: [facility]: My Dermatologist - [place] [phone]   [email or web address]    Please be aware that coverage of these services is subject to the terms and limitations of your health insurance plan.  Call member services at your health plan with any benefit or coverage questions.      Please bring the following with you to your appointment:    (1) Any X-Rays, CTs or MRIs which have been performed.  Contact the facility where they were done to arrange for  prior to your scheduled appointment.    (2) List of current medications  (3) This referral request   (4) Any documents/labs given to you for this referral                  Follow-up notes from your care team     Return in about 1 week (around 9/5/2018) for If symptoms worsen or fail to improve.      Who to contact     If you have questions or need follow up information about today's clinic visit or your schedule please contact [facility] directly at [phone].  Normal or non-critical lab and imaging results will be communicated to you by MyChart, letter or phone within 4 business days after the clinic has received the results. If you do not hear from us within 7 days, please contact the clinic through MyChart or phone. If you have a critical or abnormal lab result, we will notify you by phone as soon as possible.  Submit refill requests through [employer]  "or call your pharmacy and they will forward the refill request to us. Please allow 3 business days for your refill to be completed.          Additional Information About Your Visit        [email or web address] Information     [employer] gives you secure access to your electronic health record. If you see a primary care provider, you can also send messages to your care team and make appointments. If you have questions, please call your primary care clinic.  If you do not have a primary care provider, please call [phone] and they will assist you.        Care EveryWhere ID     This is your Care EveryWhere ID. This could be used by other organizations to access your [place] medical records  [id number]        Your Vitals Were     Pulse Temperature Respirations Height Last Period Pulse Oximetry    76 97.6  F (36.4  C) (Oral) 16 5' 1\" (1.549 m) (LMP Unknown) 99%    Breastfeeding? BMI (Body Mass Index)                No 24.56 kg/m2           Blood Pressure from Last 3 Encounters:   08/29/18 114/76   08/24/18 112/68   08/23/18 102/60    Weight from Last 3 Encounters:   08/29/18 130 lb (59 kg)   08/24/18 131 lb 3.2 oz (59.5 kg)   08/23/18 130 lb (59 kg)              We Performed the Following     CBC with platelets     Comprehensive metabolic panel     DERMATOLOGY REFERRAL     Hepatitis C Screen Reflex to HCV RNA Quant and Genotype          Today's Medication Changes          These changes are accurate as of 8/29/18  9:29 AM.  If you have any questions, ask your nurse or doctor.               Start taking these medicines.        Dose/Directions    methylPREDNISolone 4 MG tablet   Commonly known as:  MEDROL DOSEPAK   Used for:  Itching, Hives   Started by:  Cheryl Becker PA-C        Follow package instructions   Quantity:  21 tablet   Refills:  0            Where to get your medicines      These medications were sent to [facility] [address] [place], [place] [address], [place] - [address], [facility] [address]     " Phone:  [phone]     methylPREDNISolone 4 MG tablet                Primary Care Provider Office Phone # Fax #    Stephany Ferrer, KIRT [phone] [address] [address]        Equal Access to Services     LESTER DUMONT : Hadii gali ku hadgeorginao Soomaali, waaxda luqadaha, qaybta kaalmada adeegyada, waxpao idiin hayaan adeamie saba lindsay lopez. So [facility] [phone].    ATENCIÓN: Si habla español, tiene a zhou disposición servicios gratuitos de asistencia lingüística. Llame al [phone].    We comply with applicable federal civil rights laws and [place] laws. We do not discriminate on the basis of race, color, national origin, age, disability, sex, sexual orientation, or gender identity.            Thank you!     Thank you for choosing [facility]  for your care. Our goal is always to provide you with excellent care. Hearing back from our patients is one way we can continue to improve our services. Please take a few minutes to complete the written survey that you may receive in the mail after your visit with us. Thank you!             Your Updated Medication List - Protect others around you: Learn how to safely use, store and throw away your medicines at www.disposemymeds.org.          This list is accurate as of 8/29/18  9:29 AM.  Always use your most recent med list.                   Brand Name Dispense Instructions for use Diagnosis    cetirizine 10 MG tablet    zyrTEC    90 tablet    Take 1 tablet (10 mg) by mouth every evening    Chronic maxillary sinusitis, Acute seasonal allergic rhinitis due to pollen       hydrOXYzine 25 MG tablet    ATARAX    60 tablet    Take 1-2 tablets (25-50 mg) by mouth every 6 hours as needed for itching    Hives       meclizine 25 MG tablet    ANTIVERT    30 tablet    Take 1 tablet (25 mg) by mouth every 6 hours as needed for dizziness    Vertigo       methylPREDNISolone 4 MG tablet    MEDROL DOSEPAK    21 tablet    Follow  package instructions    Itching, Hives       predniSONE 20 MG tablet    DELTASONE    5 tablet    Take 1 tablet (20 mg) by mouth daily    Hives       ranitidine 300 MG tablet    ZANTAC    30 tablet    Take 1 tablet (300 mg) by mouth At Bedtime    Hives       SUMAtriptan 100 MG tablet    IMITREX    18 tablet    May repeat in 2 hours if needed: max 2/day; Do not use with sumatriptan nasal spray    Migraine with aura and with status migrainosus, not intractable       SUMAtriptan 20 MG/ACT nasal spray    IMITREX    1 each    Spray 1 spray in nostril as needed for migraine May repeat in 2 hours. Max 2 sprays/24 hours.    Migraine       triamcinolone 55 MCG/ACT inhaler    NASACORT AQ    1 Bottle    Spray 2 sprays into both nostrils daily    Acute seasonal allergic rhinitis due to pollen

## 2018-08-30 LAB
ALBUMIN SERPL-MCNC: 4.2 G/DL (ref 3.4–5)
ALP SERPL-CCNC: 83 U/L (ref 40–150)
ALT SERPL W P-5'-P-CCNC: 21 U/L (ref 0–50)
ANION GAP SERPL CALCULATED.3IONS-SCNC: 10 MMOL/L (ref 3–14)
AST SERPL W P-5'-P-CCNC: 25 U/L (ref 0–45)
BILIRUB SERPL-MCNC: 0.6 MG/DL (ref 0.2–1.3)
BUN SERPL-MCNC: 13 MG/DL (ref 7–30)
CALCIUM SERPL-MCNC: 9.2 MG/DL (ref 8.5–10.1)
CHLORIDE SERPL-SCNC: 106 MMOL/L (ref 94–109)
CHOLEST SERPL-MCNC: 179 MG/DL
CO2 SERPL-SCNC: 25 MMOL/L (ref 20–32)
CREAT SERPL-MCNC: 0.81 MG/DL (ref 0.52–1.04)
GFR SERPL CREATININE-BSD FRML MDRD: 74 ML/MIN/1.7M2
GLUCOSE SERPL-MCNC: 87 MG/DL (ref 70–99)
HCV AB SERPL QL IA: NONREACTIVE
HDLC SERPL-MCNC: 66 MG/DL
LDLC SERPL CALC-MCNC: 99 MG/DL
NONHDLC SERPL-MCNC: 113 MG/DL
POTASSIUM SERPL-SCNC: 3.9 MMOL/L (ref 3.4–5.3)
PROT SERPL-MCNC: 7.4 G/DL (ref 6.8–8.8)
SODIUM SERPL-SCNC: 141 MMOL/L (ref 133–144)
TRIGL SERPL-MCNC: 72 MG/DL

## 2018-08-31 ENCOUNTER — TELEPHONE (OUTPATIENT)
Dept: FAMILY MEDICINE | Facility: CLINIC | Age: 53
End: 2018-08-31

## 2018-08-31 NOTE — TELEPHONE ENCOUNTER
Reason for call:  Results   Name of test or procedure: bloodwork done on 8/29  Date of test or procedure: 8/29  Location of test or procedure:     Additional comments: Patient calling in to obtain lab results done on 8/29. Please callback.    Phone number to reach patient:  Cell number on file:    Telephone Information:   Mobile [phone]       Best Time:  any    Can we leave a detailed message on this number?  NO

## 2018-08-31 NOTE — TELEPHONE ENCOUNTER
Called Luba back to notify of lab results and to see if she is feeling better after prescription for prednisolone. Patient states she started it today, it seems to be working, taking 2 before bkfst, 1 at lunch, 2 before dinner and 1 before bed.   Patient will call again if symptoms return.     Sharon COSTELLO, Triage RN

## 2018-09-06 DIAGNOSIS — L50.9 HIVES: ICD-10-CM

## 2018-09-06 DIAGNOSIS — L29.9 ITCHING: ICD-10-CM

## 2018-09-06 RX ORDER — METHYLPREDNISOLONE 4 MG
TABLET, DOSE PACK ORAL
Qty: 21 TABLET | Refills: 0 | OUTPATIENT
Start: 2018-09-06

## 2018-09-06 NOTE — TELEPHONE ENCOUNTER
Derm typically wants pts off of steroids and antihistamines prior to appt.  This needs to be clarified prior to refill.  NEENA.

## 2018-09-06 NOTE — TELEPHONE ENCOUNTER
Routing to Cheryl Ellington for refill of Medrol dose due to symptoms returning? Derm & Allergy appointments next week. She'd like today if possible.    Call back patient on [phone], ok to leave detailed message.     Patient was seen 8/29/18 for unexplained hives and itching. Took a Medrol dose pack and symptoms resolved.     Took last pill, 9/5 in AM. Itching and hives returned 9/5 PM.     Patient has Derm & Allergy appointments next week. Wondering on a refill of the  Medrol dose pack in the meantime.

## 2018-09-07 NOTE — TELEPHONE ENCOUNTER
Informed patient of provider message. Advised to confirm with Derm how long to be off steroids and antihistamines before testing. Also not to take atarax and benadryl together. One or the other and same dosing.    Marli Ahumada RN

## 2018-09-12 ENCOUNTER — TRANSFERRED RECORDS (OUTPATIENT)
Dept: HEALTH INFORMATION MANAGEMENT | Facility: CLINIC | Age: 53
End: 2018-09-12

## 2018-09-20 ENCOUNTER — TELEPHONE (OUTPATIENT)
Dept: FAMILY MEDICINE | Facility: CLINIC | Age: 53
End: 2018-09-20

## 2018-09-20 NOTE — TELEPHONE ENCOUNTER
Patient did visit allergy. See record from 9/14.     Is taking certirizine 10 mg 1-2 BID, ranitidine 300 mg at hs, hydroxyzine 25 mg 1-2 q 6 hr. Is also taking a claritin in the afternoon per allergy recommendation. Does have follow up appt with allergy next wed. 9/26.    Continues with itching and no rash. Itching has localized to the upper body.     Requesting advice on options.     Please advise on further testing and appt if needed.    Marli Ahumada RN

## 2018-09-20 NOTE — TELEPHONE ENCOUNTER
Reason for call:  Other   Patient called regarding (reason for call): call back  Additional comments: Patient states that she is still itching. Has been going on for 1+month. Would like to discuss with nurse.     Phone number to reach patient:  Cell number on file:    Telephone Information:   Mobile [phone]       Best Time:  any    Can we leave a detailed message on this number?  YES

## 2018-09-21 NOTE — TELEPHONE ENCOUNTER
Call to Luba to inform of NICOLE's recommendations. She has already been taking the Benadryl plus the other regimen. She is following the recommendations of allergy/derm docs. She has an appointment next week w/ allergist. Adv to continue current regimen, keep appt, and will see if NICOLE would like anything else.     Do you have any other recommendations?     Carly ORTEZ RN

## 2018-09-21 NOTE — TELEPHONE ENCOUNTER
Beyond steroids, there really isn't anything to add but I would want her allergist to approve that before we did anything. Perhaps she should reach out to them to see if they have additional recommendations.    Cheryl Becker PA-C

## 2018-09-21 NOTE — TELEPHONE ENCOUNTER
Called pt w/ KO info. She will reach out to allergist and let us know of any recommendations.   Carly ORTEZ RN.

## 2019-02-11 ENCOUNTER — TELEPHONE (OUTPATIENT)
Dept: FAMILY MEDICINE | Facility: CLINIC | Age: 54
End: 2019-02-11

## 2019-02-11 NOTE — TELEPHONE ENCOUNTER
Type of outreach:   Patient declinesCcolonoscopy.Do not call.  Health Maintenance Due   Topic Date Due     HIV SCREEN (SYSTEM ASSIGNED)  07/02/1983     ZOSTER IMMUNIZATION (1 of 2) 07/02/2015     INFLUENZA VACCINE (1) 09/01/2018     Luba PAULA M.A.

## 2019-04-19 ENCOUNTER — OFFICE VISIT (OUTPATIENT)
Dept: FAMILY MEDICINE | Facility: CLINIC | Age: 54
End: 2019-04-19
Payer: COMMERCIAL

## 2019-04-19 ENCOUNTER — TELEPHONE (OUTPATIENT)
Dept: FAMILY MEDICINE | Facility: CLINIC | Age: 54
End: 2019-04-19

## 2019-04-19 VITALS
WEIGHT: 133.6 LBS | DIASTOLIC BLOOD PRESSURE: 70 MMHG | OXYGEN SATURATION: 98 % | TEMPERATURE: 98 F | BODY MASS INDEX: 25.22 KG/M2 | SYSTOLIC BLOOD PRESSURE: 102 MMHG | HEIGHT: 61 IN | HEART RATE: 78 BPM

## 2019-04-19 DIAGNOSIS — Z00.00 ROUTINE GENERAL MEDICAL EXAMINATION AT A HEALTH CARE FACILITY: Primary | ICD-10-CM

## 2019-04-19 DIAGNOSIS — G43.101 MIGRAINE WITH AURA AND WITH STATUS MIGRAINOSUS, NOT INTRACTABLE: ICD-10-CM

## 2019-04-19 DIAGNOSIS — J32.0 CHRONIC MAXILLARY SINUSITIS: ICD-10-CM

## 2019-04-19 DIAGNOSIS — R42 VERTIGO: ICD-10-CM

## 2019-04-19 DIAGNOSIS — H61.21 IMPACTED CERUMEN OF RIGHT EAR: ICD-10-CM

## 2019-04-19 DIAGNOSIS — Z23 NEED FOR VACCINATION: ICD-10-CM

## 2019-04-19 DIAGNOSIS — G56.03 BILATERAL CARPAL TUNNEL SYNDROME: ICD-10-CM

## 2019-04-19 PROCEDURE — 90750 HZV VACC RECOMBINANT IM: CPT | Performed by: NURSE PRACTITIONER

## 2019-04-19 PROCEDURE — 69209 REMOVE IMPACTED EAR WAX UNI: CPT | Mod: RT | Performed by: NURSE PRACTITIONER

## 2019-04-19 PROCEDURE — 99396 PREV VISIT EST AGE 40-64: CPT | Mod: 25 | Performed by: NURSE PRACTITIONER

## 2019-04-19 PROCEDURE — 90471 IMMUNIZATION ADMIN: CPT | Performed by: NURSE PRACTITIONER

## 2019-04-19 RX ORDER — MECLIZINE HYDROCHLORIDE 25 MG/1
25 TABLET ORAL EVERY 6 HOURS PRN
Qty: 30 TABLET | Refills: 1 | Status: SHIPPED | OUTPATIENT
Start: 2019-04-19 | End: 2022-09-19

## 2019-04-19 RX ORDER — SUMATRIPTAN 20 MG/1
1 SPRAY NASAL PRN
Qty: 1 EACH | Refills: 1 | Status: SHIPPED | OUTPATIENT
Start: 2019-04-19 | End: 2020-03-13

## 2019-04-19 RX ORDER — SUMATRIPTAN 100 MG/1
TABLET, FILM COATED ORAL
Qty: 18 TABLET | Refills: 3 | Status: SHIPPED | OUTPATIENT
Start: 2019-04-19 | End: 2020-03-13

## 2019-04-19 ASSESSMENT — ENCOUNTER SYMPTOMS
CHILLS: 0
COUGH: 0
FEVER: 0
DIZZINESS: 0
DIARRHEA: 0
MYALGIAS: 0
PARESTHESIAS: 0
NERVOUS/ANXIOUS: 0
PALPITATIONS: 0
HEARTBURN: 0
ABDOMINAL PAIN: 0
DYSURIA: 0
WEAKNESS: 0
HEADACHES: 0
ARTHRALGIAS: 0
JOINT SWELLING: 0
EYE PAIN: 0
FREQUENCY: 0
CONSTIPATION: 0
SHORTNESS OF BREATH: 0
HEMATOCHEZIA: 0
SORE THROAT: 0
NAUSEA: 0
HEMATURIA: 0

## 2019-04-19 ASSESSMENT — MIFFLIN-ST. JEOR: SCORE: 1148.39

## 2019-04-19 NOTE — NURSING NOTE
Patient identified using two patient identifiers.  Ear exam showing wax occlusion completed by MA.  H202/H20 was placed in the right ear(s) via Syringe.    Luba PAULA M.A.

## 2019-04-19 NOTE — TELEPHONE ENCOUNTER
Huddled with provider.    Informed pharmacy qty 1 box of 6 units for imitrex nasal spray per verbal order VO.    Marli Ahumada RN

## 2019-04-19 NOTE — PROGRESS NOTES
SUBJECTIVE:   CC: Merlene Mace is an 53 year old woman who presents for preventive health visit.     Healthy Habits:     Getting at least 3 servings of Calcium per day:  Yes    Bi-annual eye exam:  Yes    Dental care twice a year:  Yes    Sleep apnea or symptoms of sleep apnea:  None    Diet:  Regular (no restrictions)    Frequency of exercise:  6-7 days/week    Duration of exercise:  30-45 minutes    Taking medications regularly:  Yes    Medication side effects:  Not applicable    PHQ-2 Total Score: 0    Additional concerns today:  No              Today's PHQ-2 Score:   PHQ-2 ( 1999 Pfizer) 4/19/2019   Q1: Little interest or pleasure in doing things 0   Q2: Feeling down, depressed or hopeless 0   PHQ-2 Score 0   Q1: Little interest or pleasure in doing things Not at all   Q2: Feeling down, depressed or hopeless Not at all   PHQ-2 Score 0       Abuse: Current or Past(Physical, Sexual or Emotional)- No  Do you feel safe in your environment? Yes    Social History     Tobacco Use     Smoking status: Never Smoker     Smokeless tobacco: Never Used   Substance Use Topics     Alcohol use: Yes     Comment: occasional, one every few months         Alcohol Use 4/19/2019   Prescreen: >3 drinks/day or >7 drinks/week? No   Prescreen: >3 drinks/day or >7 drinks/week? -       Reviewed orders with patient.  Reviewed health maintenance and updated orders accordingly - Yes      Mammogram Screening: Patient over age 50, mutual decision to screen reflected in health maintenance.    Pertinent mammograms are reviewed under the imaging tab.  History of abnormal Pap smear: NO - age 30-65 PAP every 5 years with negative HPV co-testing recommended  PAP / HPV Latest Ref Rng & Units 3/24/2017 3/3/2014 11/18/2011   PAP - NIL NIL NIL   HPV 16 DNA NEG Negative - -   HPV 18 DNA NEG Negative - -   OTHER HR HPV NEG Negative - -     Reviewed and updated as needed this visit by clinical staff  Tobacco  Allergies  Meds  Problems          Reviewed and updated as needed this visit by Provider  Allergies  Meds  Problems            Review of Systems   Constitutional: Negative for chills and fever.   HENT: Negative for congestion, ear pain, hearing loss and sore throat.    Eyes: Negative for pain and visual disturbance.   Respiratory: Negative for cough and shortness of breath.    Cardiovascular: Negative for chest pain, palpitations and peripheral edema.   Gastrointestinal: Negative for abdominal pain, constipation, diarrhea, heartburn, hematochezia and nausea.   Genitourinary: Negative for dysuria, frequency, genital sores, hematuria and urgency.   Musculoskeletal: Negative for arthralgias, joint swelling and myalgias.   Skin: Negative for rash.   Neurological: Negative for dizziness, weakness, headaches and paresthesias.   Psychiatric/Behavioral: Negative for mood changes. The patient is not nervous/anxious.           OBJECTIVE:   Wt 60.6 kg (133 lb 9.6 oz)   LMP  (LMP Unknown)   BMI 25.24 kg/m    Physical Exam   Constitutional: She is oriented to person, place, and time. She appears well-developed and well-nourished. No distress.   HENT:   Right Ear: Tympanic membrane and external ear normal.   Left Ear: Tympanic membrane and external ear normal.   Nose: Nose normal.   Mouth/Throat: Oropharynx is clear and moist. No oropharyngeal exudate.   Cerumen impaction right ear--removed by nursing staff with ear flush   Eyes: Pupils are equal, round, and reactive to light. Conjunctivae are normal. Right eye exhibits no discharge. Left eye exhibits no discharge.   Neck: Neck supple. No tracheal deviation present. No thyromegaly present.   Cardiovascular: Normal rate, regular rhythm, S1 normal, S2 normal, normal heart sounds and normal pulses. Exam reveals no S3, no S4 and no friction rub.   No murmur heard.  Pulmonary/Chest: Effort normal and breath sounds normal. No respiratory distress. She has no wheezes. She has no rales. Right breast exhibits no  "mass, no nipple discharge and no tenderness. Left breast exhibits no mass, no nipple discharge and no tenderness.   Abdominal: Soft. Bowel sounds are normal. She exhibits no mass. There is no hepatosplenomegaly. There is no tenderness.   Musculoskeletal: Normal range of motion. She exhibits no edema.   Lymphadenopathy:     She has no cervical adenopathy.   Neurological: She is alert and oriented to person, place, and time. She has normal strength and normal reflexes. She exhibits normal muscle tone.   Skin: Skin is warm and dry. No rash noted.   Psychiatric: She has a normal mood and affect. Judgment and thought content normal. Cognition and memory are normal.             ASSESSMENT/PLAN:     Problem List Items Addressed This Visit     Carpal tunnel syndrome     Currently having occasional numbness tingling and pain, lasts for less than a day.  Refer back to Hand therapy for consult         Relevant Orders    [facility] HAND, [facility] CHIROPRACTIC REFERRAL    Chronic maxillary sinusitis     Currently under control         Migraine     Stable         Relevant Medications    SUMAtriptan (IMITREX) 100 MG tablet    SUMAtriptan (IMITREX) 20 MG/ACT nasal spray      Other Visit Diagnoses     Routine general medical examination at a health care facility    -  Primary    Vertigo        Relevant Medications    meclizine (ANTIVERT) 25 MG tablet    Need for vaccination        Relevant Orders    ADMIN 1st VACCINE (Completed)    Impacted cerumen of right ear        Relevant Orders    REMOVE IMPACTED CERUMEN (Completed)          COUNSELING:  Reviewed preventive health counseling, as reflected in patient instructions       Regular exercise       Healthy diet/nutrition    BP Readings from Last 1 Encounters:   08/29/18 114/76     Estimated body mass index is 25.24 kg/m  as calculated from the following:    Height as of 8/29/18: 1.549 m (5' 1\").    Weight as of this encounter: 60.6 kg (133 lb 9.6 oz).           reports that she has never " smoked. She has never used smokeless tobacco.      Counseling Resources:  ATP IV Guidelines  Pooled Cohorts Equation Calculator  Breast Cancer Risk Calculator  FRAX Risk Assessment  ICSI Preventive Guidelines  Dietary Guidelines for Americans, 2010  USDA's MyPlate  ASA Prophylaxis  Lung CA Screening    KIRT Tony CNP  [facility]

## 2019-04-19 NOTE — ASSESSMENT & PLAN NOTE
Currently having occasional numbness tingling and pain, lasts for less than a day.  Refer back to Hand therapy for consult

## 2019-12-15 ENCOUNTER — HEALTH MAINTENANCE LETTER (OUTPATIENT)
Age: 54
End: 2019-12-15

## 2019-12-27 ENCOUNTER — ALLIED HEALTH/NURSE VISIT (OUTPATIENT)
Dept: NURSING | Facility: CLINIC | Age: 54
End: 2019-12-27
Payer: COMMERCIAL

## 2019-12-27 DIAGNOSIS — Z23 NEED FOR PROPHYLACTIC VACCINATION AND INOCULATION AGAINST INFLUENZA: Primary | ICD-10-CM

## 2019-12-27 PROCEDURE — 90471 IMMUNIZATION ADMIN: CPT

## 2019-12-27 PROCEDURE — 90682 RIV4 VACC RECOMBINANT DNA IM: CPT

## 2020-03-12 ASSESSMENT — ENCOUNTER SYMPTOMS
SHORTNESS OF BREATH: 0
NAUSEA: 0
CHILLS: 0
COUGH: 0
CONSTIPATION: 0
DYSURIA: 0
FREQUENCY: 0
HEARTBURN: 0
WEAKNESS: 0
HEADACHES: 1
ABDOMINAL PAIN: 0
FEVER: 0
PALPITATIONS: 0
HEMATOCHEZIA: 0
HEMATURIA: 0
DIZZINESS: 0
SORE THROAT: 0
NERVOUS/ANXIOUS: 0
EYE PAIN: 0
ARTHRALGIAS: 0
JOINT SWELLING: 0
MYALGIAS: 0
PARESTHESIAS: 0
BREAST MASS: 0
DIARRHEA: 0

## 2020-03-13 ENCOUNTER — OFFICE VISIT (OUTPATIENT)
Dept: FAMILY MEDICINE | Facility: CLINIC | Age: 55
End: 2020-03-13
Payer: COMMERCIAL

## 2020-03-13 VITALS
BODY MASS INDEX: 23.39 KG/M2 | DIASTOLIC BLOOD PRESSURE: 62 MMHG | TEMPERATURE: 98.5 F | HEIGHT: 64 IN | SYSTOLIC BLOOD PRESSURE: 96 MMHG | RESPIRATION RATE: 12 BRPM | OXYGEN SATURATION: 96 % | HEART RATE: 84 BPM | WEIGHT: 137 LBS

## 2020-03-13 DIAGNOSIS — G43.101 MIGRAINE WITH AURA AND WITH STATUS MIGRAINOSUS, NOT INTRACTABLE: ICD-10-CM

## 2020-03-13 DIAGNOSIS — Z23 NEED FOR SHINGLES VACCINE: ICD-10-CM

## 2020-03-13 DIAGNOSIS — J30.1 ACUTE SEASONAL ALLERGIC RHINITIS DUE TO POLLEN: ICD-10-CM

## 2020-03-13 DIAGNOSIS — J32.0 CHRONIC MAXILLARY SINUSITIS: ICD-10-CM

## 2020-03-13 DIAGNOSIS — Z00.00 ROUTINE GENERAL MEDICAL EXAMINATION AT A HEALTH CARE FACILITY: Primary | ICD-10-CM

## 2020-03-13 PROCEDURE — 90471 IMMUNIZATION ADMIN: CPT | Performed by: NURSE PRACTITIONER

## 2020-03-13 PROCEDURE — 90750 HZV VACC RECOMBINANT IM: CPT | Performed by: NURSE PRACTITIONER

## 2020-03-13 PROCEDURE — 99396 PREV VISIT EST AGE 40-64: CPT | Mod: 25 | Performed by: NURSE PRACTITIONER

## 2020-03-13 RX ORDER — CETIRIZINE HYDROCHLORIDE 10 MG/1
10 TABLET ORAL EVERY EVENING
Qty: 90 TABLET | Refills: 3 | COMMUNITY
Start: 2020-03-13

## 2020-03-13 RX ORDER — AMITRIPTYLINE HYDROCHLORIDE 10 MG/1
10 TABLET ORAL AT BEDTIME
Qty: 30 TABLET | Refills: 1 | Status: SHIPPED | OUTPATIENT
Start: 2020-03-13 | End: 2020-05-29

## 2020-03-13 RX ORDER — SUMATRIPTAN 100 MG/1
TABLET, FILM COATED ORAL
Qty: 18 TABLET | Refills: 3 | Status: SHIPPED | OUTPATIENT
Start: 2020-03-13 | End: 2021-06-30

## 2020-03-13 RX ORDER — SUMATRIPTAN 20 MG/1
1 SPRAY NASAL PRN
Qty: 1 EACH | Refills: 3 | Status: SHIPPED | OUTPATIENT
Start: 2020-03-13 | End: 2021-09-01

## 2020-03-13 ASSESSMENT — ANXIETY QUESTIONNAIRES
2. NOT BEING ABLE TO STOP OR CONTROL WORRYING: NOT AT ALL
5. BEING SO RESTLESS THAT IT IS HARD TO SIT STILL: NOT AT ALL
7. FEELING AFRAID AS IF SOMETHING AWFUL MIGHT HAPPEN: NOT AT ALL
3. WORRYING TOO MUCH ABOUT DIFFERENT THINGS: NOT AT ALL
6. BECOMING EASILY ANNOYED OR IRRITABLE: NOT AT ALL
GAD7 TOTAL SCORE: 1
1. FEELING NERVOUS, ANXIOUS, OR ON EDGE: SEVERAL DAYS
IF YOU CHECKED OFF ANY PROBLEMS ON THIS QUESTIONNAIRE, HOW DIFFICULT HAVE THESE PROBLEMS MADE IT FOR YOU TO DO YOUR WORK, TAKE CARE OF THINGS AT HOME, OR GET ALONG WITH OTHER PEOPLE: NOT DIFFICULT AT ALL

## 2020-03-13 ASSESSMENT — PATIENT HEALTH QUESTIONNAIRE - PHQ9
5. POOR APPETITE OR OVEREATING: NOT AT ALL
SUM OF ALL RESPONSES TO PHQ QUESTIONS 1-9: 0

## 2020-03-13 ASSESSMENT — MIFFLIN-ST. JEOR: SCORE: 1206.43

## 2020-03-13 NOTE — PROGRESS NOTES
SUBJECTIVE:   CC: Merlene Mace is an 54 year old woman who presents for preventive health visit.     HPI    No concerns today. Needs refills on medications including triptan for migraines.  Migraines are occurring more days than not and patient has increased use of sumatriptan.  Discussed options for treatment including referral to headache clinic other options.  Given that she is also having some anxiety and difficulty sleeping will start with amitriptyline at HS to add to rescue medication for migraine.      Patient agrees to short term follow up.    Today's PHQ-2 Score:   PHQ-2 ( 1999 Pfizer) 3/12/2020   Q1: Little interest or pleasure in doing things 0   Q2: Feeling down, depressed or hopeless 0   PHQ-2 Score 0   Q1: Little interest or pleasure in doing things Not at all   Q2: Feeling down, depressed or hopeless Not at all   PHQ-2 Score 0       Abuse: Current or Past(Physical, Sexual or Emotional)- No  Do you feel safe in your environment? Yes        Social History     Tobacco Use     Smoking status: Never Smoker     Smokeless tobacco: Never Used   Substance Use Topics     Alcohol use: Yes     Comment: occasional, one every few months     If you drink alcohol do you typically have >3 drinks per day or >7 drinks per week? No    Alcohol Use 3/13/2020   Prescreen: >3 drinks/day or >7 drinks/week? -   Prescreen: >3 drinks/day or >7 drinks/week? Yes       Reviewed orders with patient.  Reviewed health maintenance and updated orders accordingly - Yes      Mammogram Screening: Patient over age 50, mutual decision to screen reflected in health maintenance.    Pertinent mammograms are reviewed under the imaging tab.  History of abnormal Pap smear: NO - age 30-65 PAP every 5 years with negative HPV co-testing recommended  PAP / HPV Latest Ref Rng & Units 3/24/2017 3/3/2014 11/18/2011   PAP - NIL NIL NIL   HPV 16 DNA NEG Negative - -   HPV 18 DNA NEG Negative - -   OTHER HR HPV NEG Negative - -     Reviewed and  "updated as needed this visit by clinical staff  Tobacco  Allergies  Meds  Problems  Med Hx  Surg Hx  Fam Hx  Soc Hx          Reviewed and updated as needed this visit by Provider  Tobacco  Allergies  Meds  Problems  Med Hx  Surg Hx  Fam Hx            Review of Systems   Constitutional: Negative for chills and fever.   HENT: Negative for congestion, ear pain, hearing loss and sore throat.    Eyes: Negative for pain and visual disturbance.   Respiratory: Negative for cough and shortness of breath.    Cardiovascular: Negative for chest pain, palpitations and peripheral edema.   Gastrointestinal: Negative for abdominal pain, constipation, diarrhea, heartburn, hematochezia and nausea.   Breasts:  Negative for tenderness, breast mass and discharge.   Genitourinary: Negative for dysuria, frequency, genital sores, hematuria, pelvic pain, urgency, vaginal bleeding and vaginal discharge.   Musculoskeletal: Negative for arthralgias, joint swelling and myalgias.   Skin: Negative for rash.   Neurological: Positive for headaches. Negative for dizziness, weakness and paresthesias.   Psychiatric/Behavioral: Negative for mood changes. The patient is not nervous/anxious.           OBJECTIVE:   BP 96/62 (BP Location: Right arm, Patient Position: Sitting, Cuff Size: Adult Regular)   Pulse 84   Temp 98.5  F (36.9  C) (Oral)   Resp 12   Ht 1.626 m (5' 4\")   Wt 62.1 kg (137 lb)   LMP  (LMP Unknown)   SpO2 96%   BMI 23.52 kg/m    Physical Exam  Constitutional:       General: She is not in acute distress.     Appearance: She is well-developed.   Eyes:      Conjunctiva/sclera: Conjunctivae normal.   Neck:      Musculoskeletal: Neck supple.   Cardiovascular:      Rate and Rhythm: Normal rate and regular rhythm.      Heart sounds: Normal heart sounds.   Pulmonary:      Effort: Pulmonary effort is normal.      Breath sounds: Normal breath sounds.   Abdominal:      General: Bowel sounds are normal.      Palpations: Abdomen " "is soft. There is no mass.   Lymphadenopathy:      Cervical: No cervical adenopathy.   Skin:     General: Skin is warm and dry.      Findings: No rash.               ASSESSMENT/PLAN:     Problem List Items Addressed This Visit     Chronic maxillary sinusitis    Relevant Medications    cetirizine (ZYRTEC) 10 MG tablet    Migraine    Relevant Medications    SUMAtriptan (IMITREX) 100 MG tablet    SUMAtriptan (IMITREX) 20 MG/ACT nasal spray    amitriptyline (ELAVIL) 10 MG tablet      Other Visit Diagnoses     Routine general medical examination at a health care facility    -  Primary    Need for shingles vaccine        Relevant Orders    ZOSTER VACCINE RECOMBINANT ADJUVANTED IM NJX (Completed)    Acute seasonal allergic rhinitis due to pollen        Relevant Medications    cetirizine (ZYRTEC) 10 MG tablet           Add amitriptyline for migraine, refill sumatriptan.  Pt to get mammo.  Refuses breast exam today,  May need referral for migraine if not improving.    Patient Instructions   Make a mammo appointment on your way out    Followup visit for migraine in 6 weeks.       Return in about 6 weeks (around 4/24/2020).    COUNSELING:  Reviewed preventive health counseling, as reflected in patient instructions       Regular exercise       Healthy diet/nutrition    Estimated body mass index is 23.52 kg/m  as calculated from the following:    Height as of this encounter: 1.626 m (5' 4\").    Weight as of this encounter: 62.1 kg (137 lb).         reports that she has never smoked. She has never used smokeless tobacco.      Counseling Resources:  ATP IV Guidelines  Pooled Cohorts Equation Calculator  Breast Cancer Risk Calculator  FRAX Risk Assessment  ICSI Preventive Guidelines  Dietary Guidelines for Americans, 2010  USDA's MyPlate  ASA Prophylaxis  Lung CA Screening    KIRT Tony CNP  [facility]  "

## 2020-03-14 ASSESSMENT — ANXIETY QUESTIONNAIRES: GAD7 TOTAL SCORE: 1

## 2020-03-15 ASSESSMENT — ENCOUNTER SYMPTOMS
FREQUENCY: 0
HEADACHES: 1
CONSTIPATION: 0
FEVER: 0
PARESTHESIAS: 0
COUGH: 0
MYALGIAS: 0
HEARTBURN: 0
ABDOMINAL PAIN: 0
JOINT SWELLING: 0
BREAST MASS: 0
SHORTNESS OF BREATH: 0
CHILLS: 0
WEAKNESS: 0
EYE PAIN: 0
HEMATURIA: 0
DYSURIA: 0
SORE THROAT: 0
DIZZINESS: 0
DIARRHEA: 0
PALPITATIONS: 0
HEMATOCHEZIA: 0
NERVOUS/ANXIOUS: 0
NAUSEA: 0
ARTHRALGIAS: 0

## 2020-05-29 ENCOUNTER — MYC REFILL (OUTPATIENT)
Dept: FAMILY MEDICINE | Facility: CLINIC | Age: 55
End: 2020-05-29

## 2020-05-29 DIAGNOSIS — G43.101 MIGRAINE WITH AURA AND WITH STATUS MIGRAINOSUS, NOT INTRACTABLE: ICD-10-CM

## 2020-06-01 RX ORDER — AMITRIPTYLINE HYDROCHLORIDE 10 MG/1
10 TABLET ORAL AT BEDTIME
Qty: 30 TABLET | Refills: 0 | Status: SHIPPED | OUTPATIENT
Start: 2020-06-01 | End: 2020-06-18

## 2020-06-01 NOTE — TELEPHONE ENCOUNTER
Medication is being filled for 1 time refill only due to:  Patient needs to be seen because due for migraine f/u.     Will forward to the station, please try and help the pt set up an appt for migraine f/u.  Thanks!

## 2020-06-16 NOTE — TELEPHONE ENCOUNTER
Called and left a message for pt to call back and schedule a visit to continue with her migraine medication refills.

## 2020-06-18 RX ORDER — AMITRIPTYLINE HYDROCHLORIDE 10 MG/1
10 TABLET ORAL AT BEDTIME
Qty: 30 TABLET | Refills: 2 | Status: SHIPPED | OUTPATIENT
Start: 2020-06-18 | End: 2020-08-12

## 2020-06-18 NOTE — TELEPHONE ENCOUNTER
Patient returned call.  She made video  appt with ERASTO Ferrer for July 17.  She will run out in 2 weeks so she will need a refill until then.

## 2020-06-18 NOTE — TELEPHONE ENCOUNTER
Routing refill request to provider for review/approval because:  Luz Marina given x1 and patient now scheduled but more than 1 month out from [facility], please advise    Patient already given luz marina refill per RN Refill Protocol. Routing to PCP for further refills before scheduled appt on 7/17/20.  LRF: 6/1/20 for 30 tablets with 0 refills.    Uche Quiles RN

## 2020-08-12 ENCOUNTER — VIRTUAL VISIT (OUTPATIENT)
Dept: FAMILY MEDICINE | Facility: CLINIC | Age: 55
End: 2020-08-12
Payer: COMMERCIAL

## 2020-08-12 DIAGNOSIS — G43.109 MIGRAINE WITH AURA AND WITHOUT STATUS MIGRAINOSUS, NOT INTRACTABLE: ICD-10-CM

## 2020-08-12 PROCEDURE — 99214 OFFICE O/P EST MOD 30 MIN: CPT | Mod: 95 | Performed by: NURSE PRACTITIONER

## 2020-08-12 RX ORDER — AMITRIPTYLINE HYDROCHLORIDE 10 MG/1
20 TABLET ORAL AT BEDTIME
Qty: 60 TABLET | Refills: 5 | Status: SHIPPED | OUTPATIENT
Start: 2020-08-12 | End: 2021-06-30

## 2020-08-12 RX ORDER — AMITRIPTYLINE HYDROCHLORIDE 10 MG/1
20 TABLET ORAL AT BEDTIME
Qty: 60 TABLET | Refills: 2 | Status: SHIPPED | OUTPATIENT
Start: 2020-08-12 | End: 2020-08-12

## 2020-08-12 ASSESSMENT — ENCOUNTER SYMPTOMS
UNEXPECTED WEIGHT CHANGE: 0
CONSTITUTIONAL NEGATIVE: 1
NEUROLOGICAL NEGATIVE: 1
PSYCHIATRIC NEGATIVE: 1

## 2020-08-12 NOTE — PATIENT INSTRUCTIONS
Trial of 20 mg amitriptyline at night.  Let me know if you have any side effects.  Otherwise follow up in 3-4 months to see if your migraines are significantly further improved

## 2020-08-12 NOTE — PROGRESS NOTES
"Merlene Mace is a 55 year old female who is being evaluated via a billable video visit.      The patient has been notified of following:     \"This video visit will be conducted via a call between you and your physician/provider. We have found that certain health care needs can be provided without the need for an in-person physical exam.  This service lets us provide the care you need with a video conversation.  If a prescription is necessary we can send it directly to your pharmacy.  If lab work is needed we can place an order for that and you can then stop by our lab to have the test done at a later time.    Video visits are billed at different rates depending on your insurance coverage.  Please reach out to your insurance provider with any questions.    If during the course of the call the physician/provider feels a video visit is not appropriate, you will not be charged for this service.\"    Patient has given verbal consent for Video visit? Yes  How would you like to obtain your AVS? MyChart  If you are dropped from the video visit, the video invite should be resent to: [phone]  Will anyone else be joining your video visit? No      Subjective     Merlene Mace is a 55 year old female who presents today via video visit for the following health issues:    HPI    Medication Followup of Amitriptyline 10 mg q hs and Migraine    Taking Medication as prescribed: yes    Side Effects:  None--sleeping is better, but still some insomnia.      Medication Helping Symptoms:  Yes, Migraine are about 50% better.  Very happy with results.  Is having about 5 migraines per month          Video Start Time: 2:42            Reviewed and updated as needed this visit by Provider  Tobacco  Allergies  Meds  Problems  Med Hx  Surg Hx  Fam Hx         Review of Systems   Constitutional: Negative.  Negative for unexpected weight change.   Neurological: Negative.    Psychiatric/Behavioral: Negative.             Objective  "          Vitals:  No vitals were obtained today due to virtual visit.    Physical Exam     GENERAL: Healthy, alert and no distress  EYES: Eyes grossly normal to inspection.  No discharge or erythema, or obvious scleral/conjunctival abnormalities.  RESP: No audible wheeze, cough, or visible cyanosis.  No visible retractions or increased work of breathing.    SKIN: Visible skin clear. No significant rash, abnormal pigmentation or lesions.  NEURO: Cranial nerves grossly intact.  Mentation and speech appropriate for age.  PSYCH: Mentation appears normal, affect normal/bright, judgement and insight intact, normal speech and appearance well-groomed.              Assessment & Plan   Problem List Items Addressed This Visit     Migraine     Significant improvement of migraines (5/mo) on amitriptyline 10 mg nightly.   Still some sleep issues also, no fatigue.  Trial of 20 mg amitriptyline nightly and follow up in 3-4 months for recheck         Relevant Medications    amitriptyline (ELAVIL) 10 MG tablet                 There are no Patient Instructions on file for this visit.  No follow-ups on file.    KIRT Tony CNP  [facility] [place]      Video-Visit Details    Type of service:  Video Visit    Video End Time:2:52    Originating Location (pt. Location): Home    Distant Location (provider location):  [facility]     Platform used for Video Visit: Kory    No follow-ups on file.       KIRT Tony CNP

## 2020-08-12 NOTE — ASSESSMENT & PLAN NOTE
Significant improvement of migraines (5/mo) on amitriptyline 10 mg nightly.   Still some sleep issues also, no fatigue.  Trial of 20 mg amitriptyline nightly and follow up in 3-4 months for recheck

## 2021-01-15 ENCOUNTER — HEALTH MAINTENANCE LETTER (OUTPATIENT)
Age: 56
End: 2021-01-15

## 2021-04-13 ENCOUNTER — IMMUNIZATION (OUTPATIENT)
Dept: NURSING | Facility: CLINIC | Age: 56
End: 2021-04-13
Payer: COMMERCIAL

## 2021-04-13 PROCEDURE — 0001A PR COVID VAC PFIZER DIL RECON 30 MCG/0.3 ML IM: CPT

## 2021-04-13 PROCEDURE — 91300 PR COVID VAC PFIZER DIL RECON 30 MCG/0.3 ML IM: CPT

## 2021-05-04 ENCOUNTER — IMMUNIZATION (OUTPATIENT)
Dept: NURSING | Facility: CLINIC | Age: 56
End: 2021-05-04
Attending: INTERNAL MEDICINE
Payer: COMMERCIAL

## 2021-05-04 PROCEDURE — 0002A PR COVID VAC PFIZER DIL RECON 30 MCG/0.3 ML IM: CPT

## 2021-05-04 PROCEDURE — 91300 PR COVID VAC PFIZER DIL RECON 30 MCG/0.3 ML IM: CPT

## 2021-05-15 ENCOUNTER — HEALTH MAINTENANCE LETTER (OUTPATIENT)
Age: 56
End: 2021-05-15

## 2021-06-22 ENCOUNTER — MYC MEDICAL ADVICE (OUTPATIENT)
Dept: FAMILY MEDICINE | Facility: CLINIC | Age: 56
End: 2021-06-22

## 2021-06-22 ENCOUNTER — TELEPHONE (OUTPATIENT)
Dept: FAMILY MEDICINE | Facility: CLINIC | Age: 56
End: 2021-06-22

## 2021-06-22 DIAGNOSIS — G43.101 MIGRAINE WITH AURA AND WITH STATUS MIGRAINOSUS, NOT INTRACTABLE: ICD-10-CM

## 2021-06-22 DIAGNOSIS — G43.109 MIGRAINE WITH AURA AND WITHOUT STATUS MIGRAINOSUS, NOT INTRACTABLE: ICD-10-CM

## 2021-06-22 NOTE — LETTER
July 7, 2021      Merlene Mace  [address] [place] [address]        Dear Ms. Merlene Mace,    We recently received a call from your pharmacy requesting a refill of your medication Sumatriptan & Amitriptyline.    We are contacting you today to notify you that you are due for a medication check for further refills.      Please call [phone] to schedule an appointment or if you have MyChart you can schedule with your provider as well.    Taking care of your health is important to us, an ongoing visits with your provider are vital to your care. We look forward to seeing you in the near future.    Thank you for using [facility] for your Medical Needs.          Sincerely,     Stephany Ferrer CNP, RN

## 2021-06-22 NOTE — TELEPHONE ENCOUNTER
Patient Quality Outreach      Summary:    Patient has the following on her problem list/HM: None    Patient is due/failing the following:   Breast Cancer Screening - Mammogram and Cervical Cancer Screening - PAP Needed    Type of outreach:    Sent Userscout message.    Questions for provider review:    None                                                                                                                                     Jennifer Mace CMA       Chart routed to Care Team.

## 2021-06-22 NOTE — LETTER
July 6, 2021      Merlenesergio Mace  [address] [place] [address]          We care about your health and have reviewed your health plan including your medical conditions, medications, and lab results.  Based on this review, it is recommended that you follow up regarding the following health topic(s):  -Breast Cancer Screening  -Cervical Cancer Screening    We recommend you take the following action(s):  -schedule a MAMMOGRAM which is due. Please disregard this reminder if you have had this exam elsewhere within the last 1-2 years please let us know so we can update your records.  -schedule a PAP SMEAR EXAM which is due.  Please disregard this reminder if you have had this exam elsewhere within the last year.  It would be helpful for us to have a copy of your recent pap smear report to update your records.     Please call us at the [facility] - [phone] (or use Wedit) to address the above recommendations.     Thank you for trusting [facility] and we appreciate the opportunity to serve you.  We look forward to supporting your healthcare needs in the future.    Healthy Regards,    Your Health Care Team  [facility]          Sincerely,     Stephany Ferrer, ARSALAN, RN

## 2021-06-30 NOTE — TELEPHONE ENCOUNTER
See virtual visit of 8/12/2020.  Pt was to f/u in 3-4 months.  Has not had a f/u visit.  See pts mychart.  Advised to make a virtual appt.      Will forward to the refill pool.  Please look into filling after the pt schedules an appt.      Will also forward to Liliana Ferrer and the bear station.

## 2021-07-06 NOTE — TELEPHONE ENCOUNTER
Patient Quality Outreach 2nd Attempt      Summary:    Type of outreach:    Sent letter.    Next Steps:  Reach out within 90 days via Letter.    Max number of attempts reached: Yes. Will try again in 90 days if patient still on fail list.    Questions for provider review:    None                                                                                                                    Julianne ANTONIO    [facility]       Chart routed to Care Team.

## 2021-07-07 RX ORDER — AMITRIPTYLINE HYDROCHLORIDE 10 MG/1
20 TABLET ORAL AT BEDTIME
Qty: 60 TABLET | Refills: 0 | Status: SHIPPED | OUTPATIENT
Start: 2021-07-07 | End: 2021-09-01 | Stop reason: DRUGHIGH

## 2021-07-07 RX ORDER — SUMATRIPTAN 100 MG/1
TABLET, FILM COATED ORAL
Qty: 18 TABLET | Refills: 0 | Status: SHIPPED | OUTPATIENT
Start: 2021-07-07 | End: 2021-09-01

## 2021-07-22 NOTE — TELEPHONE ENCOUNTER
Pt scheduled px 9/1 with PCP. Would like refill of medication amitriptyline (ELAVIL) 10 MG tablet  to get through until her appointment.      [facility] [address] [facility], [place] - [address], [phone], ok to leave detailed message.     Portia Tai-

## 2021-07-22 NOTE — TELEPHONE ENCOUNTER
Left detailed message that medications were sent 7/7/21 did not look like patient picked this up.     Nely Herrera RN on 7/22/2021 at 11:55 AM

## 2021-08-30 ASSESSMENT — ENCOUNTER SYMPTOMS
SORE THROAT: 0
PALPITATIONS: 0
HEMATURIA: 0
COUGH: 0
JOINT SWELLING: 0
NERVOUS/ANXIOUS: 0
HEARTBURN: 0
WEAKNESS: 0
FEVER: 0
DYSURIA: 0
CONSTIPATION: 0
DIZZINESS: 0
ARTHRALGIAS: 0
DIARRHEA: 0
EYE PAIN: 0
HEMATOCHEZIA: 0
HEADACHES: 0
CHILLS: 0
ABDOMINAL PAIN: 0
FREQUENCY: 0
NAUSEA: 0
BREAST MASS: 0
MYALGIAS: 0
PARESTHESIAS: 0
SHORTNESS OF BREATH: 0

## 2021-09-01 ENCOUNTER — OFFICE VISIT (OUTPATIENT)
Dept: FAMILY MEDICINE | Facility: CLINIC | Age: 56
End: 2021-09-01
Payer: COMMERCIAL

## 2021-09-01 VITALS
OXYGEN SATURATION: 95 % | TEMPERATURE: 98.6 F | RESPIRATION RATE: 14 BRPM | DIASTOLIC BLOOD PRESSURE: 86 MMHG | HEIGHT: 65 IN | SYSTOLIC BLOOD PRESSURE: 112 MMHG | BODY MASS INDEX: 23.69 KG/M2 | WEIGHT: 142.2 LBS | HEART RATE: 86 BPM

## 2021-09-01 DIAGNOSIS — G43.101 MIGRAINE WITH AURA AND WITH STATUS MIGRAINOSUS, NOT INTRACTABLE: ICD-10-CM

## 2021-09-01 DIAGNOSIS — Z00.00 ROUTINE GENERAL MEDICAL EXAMINATION AT A HEALTH CARE FACILITY: ICD-10-CM

## 2021-09-01 DIAGNOSIS — G43.109 MIGRAINE WITH AURA AND WITHOUT STATUS MIGRAINOSUS, NOT INTRACTABLE: ICD-10-CM

## 2021-09-01 DIAGNOSIS — Z12.31 VISIT FOR SCREENING MAMMOGRAM: ICD-10-CM

## 2021-09-01 PROCEDURE — 99213 OFFICE O/P EST LOW 20 MIN: CPT | Mod: 25 | Performed by: NURSE PRACTITIONER

## 2021-09-01 PROCEDURE — 99396 PREV VISIT EST AGE 40-64: CPT | Performed by: NURSE PRACTITIONER

## 2021-09-01 RX ORDER — SUMATRIPTAN 100 MG/1
TABLET, FILM COATED ORAL
Qty: 18 TABLET | Refills: 1 | Status: SHIPPED | OUTPATIENT
Start: 2021-09-01 | End: 2023-09-25

## 2021-09-01 RX ORDER — AMITRIPTYLINE HYDROCHLORIDE 10 MG/1
30 TABLET ORAL AT BEDTIME
Qty: 270 TABLET | Refills: 3 | Status: SHIPPED | OUTPATIENT
Start: 2021-09-01 | End: 2022-09-19

## 2021-09-01 ASSESSMENT — ENCOUNTER SYMPTOMS
HEARTBURN: 0
NERVOUS/ANXIOUS: 0
EYE PAIN: 0
ABDOMINAL PAIN: 0
MYALGIAS: 0
ARTHRALGIAS: 0
SORE THROAT: 0
DYSURIA: 0
HEMATOCHEZIA: 0
PALPITATIONS: 0
COUGH: 0
BREAST MASS: 0
CHILLS: 0
JOINT SWELLING: 0
NAUSEA: 0
HEADACHES: 0
CONSTIPATION: 0
SHORTNESS OF BREATH: 0
DIARRHEA: 0
DIZZINESS: 0
HEMATURIA: 0
WEAKNESS: 0
PARESTHESIAS: 0
FREQUENCY: 0
FEVER: 0

## 2021-09-01 ASSESSMENT — MIFFLIN-ST. JEOR: SCORE: 1231.92

## 2021-09-01 ASSESSMENT — PAIN SCALES - GENERAL: PAINLEVEL: NO PAIN (0)

## 2021-09-01 NOTE — ASSESSMENT & PLAN NOTE
Worsening migraines with stress at work and with seasonal change.  Avg migraines 5-6 per month.  Would like to increase the amitriptyline from 20 mg per day to 30 mg per day.

## 2021-09-01 NOTE — PATIENT INSTRUCTIONS
Flu shot in our pharmacy  Dental visit after your covid booster  Increase your amitriptyline to 30 mg per night.  Phone visit if migraines not better.  Also let us know if weight gain worsens
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
## 2021-12-27 ENCOUNTER — IMMUNIZATION (OUTPATIENT)
Dept: NURSING | Facility: CLINIC | Age: 56
End: 2021-12-27
Payer: COMMERCIAL

## 2021-12-27 PROCEDURE — 0004A PR COVID VAC PFIZER DIL RECON 30 MCG/0.3 ML IM: CPT

## 2021-12-27 PROCEDURE — 91300 PR COVID VAC PFIZER DIL RECON 30 MCG/0.3 ML IM: CPT

## 2022-02-19 ENCOUNTER — HEALTH MAINTENANCE LETTER (OUTPATIENT)
Age: 57
End: 2022-02-19

## 2022-05-02 ENCOUNTER — IMMUNIZATION (OUTPATIENT)
Dept: NURSING | Facility: CLINIC | Age: 57
End: 2022-05-02
Payer: COMMERCIAL

## 2022-05-02 PROCEDURE — 0054A COVID-19,PF,PFIZER (12+ YRS): CPT

## 2022-05-02 PROCEDURE — 91305 COVID-19,PF,PFIZER (12+ YRS): CPT

## 2022-09-12 ASSESSMENT — ENCOUNTER SYMPTOMS
DYSURIA: 0
HEADACHES: 0
NERVOUS/ANXIOUS: 0
DIZZINESS: 0
NAUSEA: 0
PALPITATIONS: 0
MYALGIAS: 0
HEARTBURN: 0
PARESTHESIAS: 0
ARTHRALGIAS: 0
HEMATOCHEZIA: 0
JOINT SWELLING: 0
FREQUENCY: 0
WEAKNESS: 0
ABDOMINAL PAIN: 0
FEVER: 0
COUGH: 0
CONSTIPATION: 0
EYE PAIN: 0
DIARRHEA: 0
SORE THROAT: 0
SHORTNESS OF BREATH: 0
CHILLS: 0
BREAST MASS: 0
HEMATURIA: 0

## 2022-09-16 ENCOUNTER — IMMUNIZATION (OUTPATIENT)
Dept: NURSING | Facility: CLINIC | Age: 57
End: 2022-09-16
Payer: COMMERCIAL

## 2022-09-16 PROCEDURE — 0124A COVID-19,PF,PFIZER BOOSTER BIVALENT: CPT

## 2022-09-16 PROCEDURE — 91312 COVID-19,PF,PFIZER BOOSTER BIVALENT: CPT

## 2022-09-19 ENCOUNTER — OFFICE VISIT (OUTPATIENT)
Dept: FAMILY MEDICINE | Facility: CLINIC | Age: 57
End: 2022-09-19
Payer: COMMERCIAL

## 2022-09-19 VITALS
HEART RATE: 104 BPM | SYSTOLIC BLOOD PRESSURE: 110 MMHG | BODY MASS INDEX: 24.24 KG/M2 | RESPIRATION RATE: 14 BRPM | OXYGEN SATURATION: 97 % | HEIGHT: 64 IN | DIASTOLIC BLOOD PRESSURE: 74 MMHG | TEMPERATURE: 98.5 F | WEIGHT: 142 LBS

## 2022-09-19 DIAGNOSIS — G43.101 MIGRAINE WITH AURA AND WITH STATUS MIGRAINOSUS, NOT INTRACTABLE: ICD-10-CM

## 2022-09-19 DIAGNOSIS — Z00.00 ROUTINE GENERAL MEDICAL EXAMINATION AT A HEALTH CARE FACILITY: Primary | ICD-10-CM

## 2022-09-19 DIAGNOSIS — Z12.11 SCREEN FOR COLON CANCER: ICD-10-CM

## 2022-09-19 DIAGNOSIS — Z12.31 VISIT FOR SCREENING MAMMOGRAM: ICD-10-CM

## 2022-09-19 DIAGNOSIS — Z12.4 CERVICAL CANCER SCREENING: ICD-10-CM

## 2022-09-19 DIAGNOSIS — R42 VERTIGO: ICD-10-CM

## 2022-09-19 DIAGNOSIS — R00.0 TACHYCARDIA: ICD-10-CM

## 2022-09-19 LAB — HGB BLD-MCNC: 14.3 G/DL (ref 11.7–15.7)

## 2022-09-19 PROCEDURE — G0145 SCR C/V CYTO,THINLAYER,RESCR: HCPCS | Performed by: NURSE PRACTITIONER

## 2022-09-19 PROCEDURE — 87624 HPV HI-RISK TYP POOLED RSLT: CPT | Performed by: NURSE PRACTITIONER

## 2022-09-19 PROCEDURE — 90471 IMMUNIZATION ADMIN: CPT | Performed by: NURSE PRACTITIONER

## 2022-09-19 PROCEDURE — 84443 ASSAY THYROID STIM HORMONE: CPT | Performed by: NURSE PRACTITIONER

## 2022-09-19 PROCEDURE — 85018 HEMOGLOBIN: CPT | Performed by: NURSE PRACTITIONER

## 2022-09-19 PROCEDURE — 80053 COMPREHEN METABOLIC PANEL: CPT | Performed by: NURSE PRACTITIONER

## 2022-09-19 PROCEDURE — 99396 PREV VISIT EST AGE 40-64: CPT | Mod: 25 | Performed by: NURSE PRACTITIONER

## 2022-09-19 PROCEDURE — 99214 OFFICE O/P EST MOD 30 MIN: CPT | Mod: 25 | Performed by: NURSE PRACTITIONER

## 2022-09-19 PROCEDURE — 36415 COLL VENOUS BLD VENIPUNCTURE: CPT | Performed by: NURSE PRACTITIONER

## 2022-09-19 PROCEDURE — 90682 RIV4 VACC RECOMBINANT DNA IM: CPT | Performed by: NURSE PRACTITIONER

## 2022-09-19 RX ORDER — RIZATRIPTAN BENZOATE 10 MG/1
10 TABLET, ORALLY DISINTEGRATING ORAL
Qty: 30 TABLET | Refills: 1 | Status: SHIPPED | OUTPATIENT
Start: 2022-09-19 | End: 2023-09-25

## 2022-09-19 RX ORDER — MECLIZINE HYDROCHLORIDE 25 MG/1
25 TABLET ORAL EVERY 6 HOURS PRN
Qty: 30 TABLET | Refills: 1 | Status: SHIPPED | OUTPATIENT
Start: 2022-09-19 | End: 2023-09-25

## 2022-09-19 RX ORDER — AMITRIPTYLINE HYDROCHLORIDE 10 MG/1
30 TABLET ORAL AT BEDTIME
Qty: 270 TABLET | Refills: 3 | Status: SHIPPED | OUTPATIENT
Start: 2022-09-19 | End: 2023-09-25

## 2022-09-19 ASSESSMENT — ENCOUNTER SYMPTOMS
HEMATOCHEZIA: 0
CHILLS: 0
HEADACHES: 0
JOINT SWELLING: 0
HEMATURIA: 0
EYE PAIN: 0
NERVOUS/ANXIOUS: 0
ABDOMINAL PAIN: 0
BREAST MASS: 0
ARTHRALGIAS: 0
FEVER: 0
PARESTHESIAS: 0
CONSTIPATION: 0
NAUSEA: 0
SORE THROAT: 0
DYSURIA: 0
SHORTNESS OF BREATH: 0
WEAKNESS: 0
PALPITATIONS: 0
HEARTBURN: 0
MYALGIAS: 0
DIARRHEA: 0
DIZZINESS: 0
COUGH: 0
FREQUENCY: 0

## 2022-09-19 ASSESSMENT — PAIN SCALES - GENERAL: PAINLEVEL: NO PAIN (0)

## 2022-09-19 NOTE — PROGRESS NOTES
SUBJECTIVE:   CC: Luba is an 57 year old who presents for preventive health visit.       Patient has been advised of split billing requirements and indicates understanding: Yes  Healthy Habits:     Getting at least 3 servings of Calcium per day:  Yes    Bi-annual eye exam:  Yes    Dental care twice a year:  Yes    Sleep apnea or symptoms of sleep apnea:  None    Diet:  Regular (no restrictions)    Frequency of exercise:  6-7 days/week    Duration of exercise:  45-60 minutes    Taking medications regularly:  Yes    Medication side effects:  Not applicable    PHQ-2 Total Score: 0    Additional concerns today:  No    Migraine  Approximately 6-7 migraines per month.  Using Imitrex but feels the onset is slow and that it is not working to get rid of migraine effectively.  Discussed options and pt would like to try rizatriptan ODT.  Will follow up to discuss further whether a change in her preventive medication is indicated as well.    Tachycardia  Pulse elevated today at 104-- recheck 97.  Thinks this is because she is having early symptoms of migraine.  No symptoms of thyroid disorder, no fever or chills.         Today's PHQ-2 Score:   PHQ-2 ( 1999 Pfizer) 9/12/2022   Q1: Little interest or pleasure in doing things 0   Q2: Feeling down, depressed or hopeless 0   PHQ-2 Score 0   PHQ-2 Total Score (12-17 Years)- Positive if 3 or more points; Administer PHQ-A if positive -   Q1: Little interest or pleasure in doing things Not at all   Q2: Feeling down, depressed or hopeless Not at all   PHQ-2 Score 0       Abuse: Current or Past (Physical, Sexual or Emotional) - No  Do you feel safe in your environment? Yes        Social History     Tobacco Use     Smoking status: Never Smoker     Smokeless tobacco: Never Used   Substance Use Topics     Alcohol use: Yes     Comment: occasional, one every few months     If you drink alcohol do you typically have >3 drinks per day or >7 drinks per week? No    Alcohol Use 9/12/2022    Prescreen: >3 drinks/day or >7 drinks/week? No   Prescreen: >3 drinks/day or >7 drinks/week? -     Reviewed orders with patient.  Reviewed health maintenance and updated orders accordingly - Yes      Breast Cancer Screening:    Breast CA Risk Assessment (FHS-7) 9/12/2022   Do you have a family history of breast, colon, or ovarian cancer? No / Unknown         Mammogram Screening: Recommended mammography every 1-2 years with patient discussion and risk factor consideration  Pertinent mammograms are reviewed under the imaging tab.    History of abnormal Pap smear: NO - age 30-65 PAP every 5 years with negative HPV co-testing recommended  PAP / HPV Latest Ref Rng & Units 3/24/2017 3/3/2014 11/18/2011   PAP (Historical) - NIL NIL NIL   HPV16 NEG Negative - -   HPV18 NEG Negative - -   HRHPV NEG Negative - -     Reviewed and updated as needed this visit by clinical staff   Tobacco  Allergies  Meds   Med Hx  Surg Hx  Fam Hx  Soc Hx          Reviewed and updated as needed this visit by Provider   Tobacco  Allergies  Meds  Problems  Med Hx  Surg Hx  Fam Hx               Review of Systems   Constitutional: Negative for chills and fever.   HENT: Negative for congestion, ear pain, hearing loss and sore throat.    Eyes: Negative for pain and visual disturbance.   Respiratory: Negative for cough and shortness of breath.    Cardiovascular: Negative for chest pain, palpitations and peripheral edema.   Gastrointestinal: Negative for abdominal pain, constipation, diarrhea, heartburn, hematochezia and nausea.   Breasts:  Negative for tenderness, breast mass and discharge.   Genitourinary: Negative for dysuria, frequency, genital sores, hematuria, pelvic pain, urgency, vaginal bleeding and vaginal discharge.   Musculoskeletal: Negative for arthralgias, joint swelling and myalgias.   Skin: Negative for rash.   Neurological: Negative for dizziness, weakness, headaches and paresthesias.   Psychiatric/Behavioral: Negative  "for mood changes. The patient is not nervous/anxious.           OBJECTIVE:   /74 (BP Location: Right arm, Patient Position: Sitting, Cuff Size: Adult Regular)   Pulse 104   Temp 98.5  F (36.9  C) (Oral)   Resp 14   Ht 1.626 m (5' 4\")   Wt 64.4 kg (142 lb)   LMP  (LMP Unknown)   SpO2 97%   BMI 24.37 kg/m    Physical Exam  Constitutional:       General: She is not in acute distress.     Appearance: She is well-developed.   HENT:      Right Ear: Tympanic membrane and external ear normal.      Left Ear: Tympanic membrane and external ear normal.      Nose: Nose normal.      Mouth/Throat:      Pharynx: No oropharyngeal exudate.   Eyes:      General:         Right eye: No discharge.         Left eye: No discharge.      Conjunctiva/sclera: Conjunctivae normal.      Pupils: Pupils are equal, round, and reactive to light.   Neck:      Thyroid: No thyromegaly.      Trachea: No tracheal deviation.   Cardiovascular:      Rate and Rhythm: Normal rate and regular rhythm.      Pulses: Normal pulses.      Heart sounds: Normal heart sounds, S1 normal and S2 normal. No murmur heard.    No friction rub. No S3 or S4 sounds.   Pulmonary:      Effort: Pulmonary effort is normal. No respiratory distress.      Breath sounds: Normal breath sounds. No wheezing or rales.   Abdominal:      General: Bowel sounds are normal.      Palpations: Abdomen is soft. There is no mass.      Tenderness: There is no abdominal tenderness.   Genitourinary:     Vagina: Normal. No vaginal discharge.      Cervix: No cervical motion tenderness or discharge.   Musculoskeletal:         General: Normal range of motion.      Cervical back: Neck supple.   Lymphadenopathy:      Cervical: No cervical adenopathy.   Skin:     General: Skin is warm and dry.      Findings: No rash.   Neurological:      Mental Status: She is alert and oriented to person, place, and time.      Motor: No abnormal muscle tone.      Deep Tendon Reflexes: Reflexes are normal and " "symmetric.   Psychiatric:         Thought Content: Thought content normal.         Judgment: Judgment normal.           ASSESSMENT/PLAN:       ICD-10-CM    1. Routine general medical examination at a health care facility  Z00.00    2. Screen for colon cancer  Z12.11    3. Visit for screening mammogram  Z12.31 MA SCREENING DIGITAL BILAT - Future  (s+30)   4. Cervical cancer screening  Z12.4 Pap Screen with HPV - recommended age 30 - 65 years     Fecal colorectal cancer screen (FIT)     Fecal colorectal cancer screen (FIT)   5. Vertigo  R42 meclizine (ANTIVERT) 25 MG tablet   6. Migraine with aura and with status migrainosus, not intractable  G43.101 rizatriptan (MAXALT-MLT) 10 MG ODT     amitriptyline (ELAVIL) 10 MG tablet   7. Tachycardia  R00.0 TSH with free T4 reflex     Hemoglobin     Comprehensive metabolic panel (BMP + Alb, Alk Phos, ALT, AST, Total. Bili, TP)     TSH with free T4 reflex     Hemoglobin     Comprehensive metabolic panel (BMP + Alb, Alk Phos, ALT, AST, Total. Bili, TP)     Migraine-- trial of rizatriptan and return to clinic to discuss possible change in preventive medication    Tachycardia-- regular rhythm and no history of palpitations. labs today and recheck pulse again at follow up        Return in about 1 month (around 10/19/2022) for In Clinic Follow Up.      COUNSELING:  Reviewed preventive health counseling, as reflected in patient instructions       Regular exercise       Healthy diet/nutrition    Estimated body mass index is 23.85 kg/m  as calculated from the following:    Height as of 9/1/21: 1.645 m (5' 4.75\").    Weight as of 9/1/21: 64.5 kg (142 lb 3.2 oz).        She reports that she has never smoked. She has never used smokeless tobacco.      Counseling Resources:  ATP IV Guidelines  Pooled Cohorts Equation Calculator  Breast Cancer Risk Calculator  BRCA-Related Cancer Risk Assessment: FHS-7 Tool  FRAX Risk Assessment  ICSI Preventive Guidelines  Dietary Guidelines for Americans, " 2010  USDA's MyPlate  ASA Prophylaxis  Lung CA Screening    KIRT Tony CNP  [facility]

## 2022-09-19 NOTE — ASSESSMENT & PLAN NOTE
Pulse elevated today at 104-- recheck 97.  Thinks this is because she is having early symptoms of migraine.  No symptoms of thyroid disorder, no fever or chills.

## 2022-09-20 LAB
ALBUMIN SERPL-MCNC: 4 G/DL (ref 3.4–5)
ALP SERPL-CCNC: 98 U/L (ref 40–150)
ALT SERPL W P-5'-P-CCNC: 25 U/L (ref 0–50)
ANION GAP SERPL CALCULATED.3IONS-SCNC: 9 MMOL/L (ref 3–14)
AST SERPL W P-5'-P-CCNC: 25 U/L (ref 0–45)
BILIRUB SERPL-MCNC: 0.3 MG/DL (ref 0.2–1.3)
BUN SERPL-MCNC: 9 MG/DL (ref 7–30)
CALCIUM SERPL-MCNC: 9.5 MG/DL (ref 8.5–10.1)
CHLORIDE BLD-SCNC: 103 MMOL/L (ref 94–109)
CO2 SERPL-SCNC: 24 MMOL/L (ref 20–32)
CREAT SERPL-MCNC: 0.84 MG/DL (ref 0.52–1.04)
GFR SERPL CREATININE-BSD FRML MDRD: 81 ML/MIN/1.73M2
GLUCOSE BLD-MCNC: 104 MG/DL (ref 70–99)
POTASSIUM BLD-SCNC: 4 MMOL/L (ref 3.4–5.3)
PROT SERPL-MCNC: 7.7 G/DL (ref 6.8–8.8)
SODIUM SERPL-SCNC: 136 MMOL/L (ref 133–144)
TSH SERPL DL<=0.005 MIU/L-ACNC: 1.69 MU/L (ref 0.4–4)

## 2022-09-21 LAB
BKR LAB AP GYN ADEQUACY: NORMAL
BKR LAB AP GYN INTERPRETATION: NORMAL
BKR LAB AP HPV REFLEX: NORMAL
BKR LAB AP PREVIOUS ABNORMAL: NORMAL
PATH REPORT.COMMENTS IMP SPEC: NORMAL
PATH REPORT.COMMENTS IMP SPEC: NORMAL
PATH REPORT.RELEVANT HX SPEC: NORMAL

## 2022-09-23 LAB
HUMAN PAPILLOMA VIRUS 16 DNA: NEGATIVE
HUMAN PAPILLOMA VIRUS 18 DNA: NEGATIVE
HUMAN PAPILLOMA VIRUS FINAL DIAGNOSIS: NORMAL
HUMAN PAPILLOMA VIRUS OTHER HR: NEGATIVE

## 2023-04-01 ENCOUNTER — HEALTH MAINTENANCE LETTER (OUTPATIENT)
Age: 58
End: 2023-04-01

## 2023-05-18 ENCOUNTER — TRANSFERRED RECORDS (OUTPATIENT)
Dept: HEALTH INFORMATION MANAGEMENT | Facility: CLINIC | Age: 58
End: 2023-05-18
Payer: COMMERCIAL

## 2023-09-01 ENCOUNTER — TRANSFERRED RECORDS (OUTPATIENT)
Dept: HEALTH INFORMATION MANAGEMENT | Facility: CLINIC | Age: 58
End: 2023-09-01
Payer: COMMERCIAL

## 2023-09-11 ENCOUNTER — TELEPHONE (OUTPATIENT)
Dept: FAMILY MEDICINE | Facility: CLINIC | Age: 58
End: 2023-09-11

## 2023-09-11 NOTE — TELEPHONE ENCOUNTER
Patient Quality Outreach    Patient is due for the following:   Colon Cancer Screening  Breast Cancer Screening - Mammogram    Next Steps:   No follow up needed at this time.    Type of outreach:    Sent [employer] message.      Questions for provider review:    None           Ashanti Ramos MA         Loss of subcutaneous fat.../Loss of muscle...

## 2023-09-11 NOTE — LETTER
[facility]  [address] [address] [phone]       October 2, 2023    Merlene Mace  [address] [place] [address]    Dear Luba,    We care about your health and have reviewed your health plan and are making recommendations based on this review, to optimize your health.    You are in particular need of attention regarding:  -Breast Cancer Screening  -Colon Cancer Screening    We are recommending that you:  -schedule a MAMMOGRAM which is due.    1 in 8 women will develop invasive breast cancer during her lifetime and it is the most common non-skin cancer in American women.  EARLY detection, new treatments, and a better understanding of the disease have increased survival rates - the 5 year survival rate in the 1960s was 63% and today it is close to 90%.    If you are under/uninsured, we recommend you contact the [facility]. They offer mammograms at no charge or on a sliding fee charge. You can schedule with them at [phone]. Please have them send us the results.      Please disregard this reminder if you have had this exam elsewhere within the last year.  It would be helpful for us to have a copy of your mammogram report in your file so that we can best coordinate your care - please contact us with when your test was done so we can update your record.             -schedule a COLONOSCOPY to look for colon cancer (due every 10 years or 5 years in higher risk situations.)        Colon cancer is now the second leading cause of cancer-related deaths in the [place] for both men and women and there are over 130,000 new cases and 50,000 deaths per year from colon cancer.  Colonoscopies can prevent 90-95% of these deaths.  Problem lesions can be removed before they ever become cancer.  This test is not only looking for cancer, but also getting rid of precancerious lesions.    If you are under/uninsured, we recommend you contact the [facility] program. [facility] is a free colorectal cancer screening program that provides colonoscopies for eligible under/uninsured [place] men and women. If you are interested in receiving a free colonoscopy, please call [employer] at [phone] (mention code [facility]) to see if you re eligible.      If you do not wish to do a colonoscopy or cannot afford to do one, at this time, there is another option. It is called a FIT test or Fecal Immunochemical Occult Blood Test (take home stool sample kit).  It does not replace the colonoscopy for colorectal cancer screening, but it can detect hidden bleeding in the lower colon.  It does need to be repeated every year and if a positive result is obtained, you would be referred for a colonoscopy.          If you have completed either one of these tests at another facility, please call with the details of when and where the tests were done and if they were normal or not. Or have the records sent to our clinic so that we can best coordinate your care.    In addition, here is a list of due or overdue Health Maintenance reminders.    Health Maintenance Due   Topic Date Due    Colorectal Cancer Screening  Never done    Mammogram  08/29/2019    PHQ-2 (once per calendar year)  01/01/2023    Cholesterol Lab  08/29/2023    COVID-19 Vaccine (6 - 2023-24 season) 09/01/2023       To address the above recommendations, we encourage you to contact us at [phone], via Bio or by contacting Central Scheduling toll free at [phone] 24 hours a day. They will assist you with finding the most convenient time and location.    Thank you for trusting [facility] and we appreciate the opportunity to serve you.  We look forward to supporting your healthcare needs in the future.    Healthy Regards,    Your [facility] Team

## 2023-09-11 NOTE — LETTER
[facility]  [address] [address] [phone]       January 2, 2024    Merlene Mace  [address] [place] [address]    Dear Luba,    We care about your health and have reviewed your health plan and are making recommendations based on this review, to optimize your health.    You are in particular need of attention regarding:  -Breast Cancer Screening  -Colon Cancer Screening    We are recommending that you:  -schedule a MAMMOGRAM which is due.    1 in 8 women will develop invasive breast cancer during her lifetime and it is the most common non-skin cancer in American women.  EARLY detection, new treatments, and a better understanding of the disease have increased survival rates - the 5 year survival rate in the 1960s was 63% and today it is close to 90%.    If you are under/uninsured, we recommend you contact the [facility]. They offer mammograms at no charge or on a sliding fee charge. You can schedule with them at [phone]. Please have them send us the results.      Please disregard this reminder if you have had this exam elsewhere within the last year.  It would be helpful for us to have a copy of your mammogram report in your file so that we can best coordinate your care - please contact us with when your test was done so we can update your record.             -schedule a COLONOSCOPY to look for colon cancer (due every 10 years or 5 years in higher risk situations.)        Colon cancer is now the second leading cause of cancer-related deaths in the [place] for both men and women and there are over 130,000 new cases and 50,000 deaths per year from colon cancer.  Colonoscopies can prevent 90-95% of these deaths.  Problem lesions can be removed before they ever become cancer.  This test is not only looking for cancer, but also getting rid of precancerious lesions.    If you are under/uninsured, we recommend you contact the [facility] program. [facility] is a free colorectal cancer screening program that provides colonoscopies for eligible under/uninsured [place] men and women. If you are interested in receiving a free colonoscopy, please call [employer] at [phone] (mention code [facility]) to see if you re eligible.      If you do not wish to do a colonoscopy or cannot afford to do one, at this time, there is another option. It is called a FIT test or Fecal Immunochemical Occult Blood Test (take home stool sample kit).  It does not replace the colonoscopy for colorectal cancer screening, but it can detect hidden bleeding in the lower colon.  It does need to be repeated every year and if a positive result is obtained, you would be referred for a colonoscopy.          If you have completed either one of these tests at another facility, please call with the details of when and where the tests were done and if they were normal or not. Or have the records sent to our clinic so that we can best coordinate your care.    In addition, here is a list of due or overdue Health Maintenance reminders.    Health Maintenance Due   Topic Date Due    Colorectal Cancer Screening  Never done    Mammogram  08/29/2019    Cholesterol Lab  08/29/2023    Hepatitis B Vaccine (2 of 3 - 19+ 3-dose series) 10/23/2023    PHQ-2 (once per calendar year)  01/01/2024       To address the above recommendations, we encourage you to contact us at [phone], via [employer] or by contacting Central Scheduling toll free at [phone] 24 hours a day. They will assist you with finding the most convenient time and location.    Thank you for trusting [facility] and we appreciate the opportunity to serve you.  We look forward to supporting your healthcare needs in the future.    Healthy Regards,    Your [facility] Team

## 2023-09-22 ASSESSMENT — ENCOUNTER SYMPTOMS
PALPITATIONS: 0
BREAST MASS: 0
FEVER: 0
HEMATURIA: 0
SORE THROAT: 0
EYE PAIN: 0
JOINT SWELLING: 0
SHORTNESS OF BREATH: 0
CONSTIPATION: 0
DIARRHEA: 0
HEARTBURN: 0
NERVOUS/ANXIOUS: 0
NAUSEA: 0
HEMATOCHEZIA: 0
ARTHRALGIAS: 0
CHILLS: 0
MYALGIAS: 0
WEAKNESS: 0
DYSURIA: 0
FREQUENCY: 0
HEADACHES: 0
PARESTHESIAS: 0
COUGH: 0
DIZZINESS: 0
ABDOMINAL PAIN: 0

## 2023-09-25 ENCOUNTER — OFFICE VISIT (OUTPATIENT)
Dept: FAMILY MEDICINE | Facility: CLINIC | Age: 58
End: 2023-09-25
Payer: COMMERCIAL

## 2023-09-25 VITALS
HEIGHT: 64 IN | HEART RATE: 83 BPM | WEIGHT: 143 LBS | BODY MASS INDEX: 24.41 KG/M2 | TEMPERATURE: 98.3 F | SYSTOLIC BLOOD PRESSURE: 118 MMHG | DIASTOLIC BLOOD PRESSURE: 76 MMHG | RESPIRATION RATE: 18 BRPM | OXYGEN SATURATION: 97 %

## 2023-09-25 DIAGNOSIS — G43.101 MIGRAINE WITH AURA AND WITH STATUS MIGRAINOSUS, NOT INTRACTABLE: ICD-10-CM

## 2023-09-25 DIAGNOSIS — Z00.00 ROUTINE GENERAL MEDICAL EXAMINATION AT A HEALTH CARE FACILITY: Primary | ICD-10-CM

## 2023-09-25 DIAGNOSIS — Z12.11 SCREEN FOR COLON CANCER: ICD-10-CM

## 2023-09-25 DIAGNOSIS — Z12.31 VISIT FOR SCREENING MAMMOGRAM: ICD-10-CM

## 2023-09-25 DIAGNOSIS — R42 VERTIGO: ICD-10-CM

## 2023-09-25 DIAGNOSIS — R00.0 TACHYCARDIA: ICD-10-CM

## 2023-09-25 PROCEDURE — 90471 IMMUNIZATION ADMIN: CPT | Performed by: NURSE PRACTITIONER

## 2023-09-25 PROCEDURE — 90472 IMMUNIZATION ADMIN EACH ADD: CPT | Performed by: NURSE PRACTITIONER

## 2023-09-25 PROCEDURE — 90746 HEPB VACCINE 3 DOSE ADULT IM: CPT | Performed by: NURSE PRACTITIONER

## 2023-09-25 PROCEDURE — 90682 RIV4 VACC RECOMBINANT DNA IM: CPT | Performed by: NURSE PRACTITIONER

## 2023-09-25 PROCEDURE — 99396 PREV VISIT EST AGE 40-64: CPT | Mod: 25 | Performed by: NURSE PRACTITIONER

## 2023-09-25 RX ORDER — FLUOCINONIDE TOPICAL SOLUTION USP, 0.05% 0.5 MG/ML
SOLUTION TOPICAL
COMMUNITY
Start: 2023-05-18

## 2023-09-25 RX ORDER — MECLIZINE HYDROCHLORIDE 25 MG/1
25 TABLET ORAL EVERY 6 HOURS PRN
Qty: 30 TABLET | Refills: 1 | Status: SHIPPED | OUTPATIENT
Start: 2023-09-25 | End: 2024-09-23

## 2023-09-25 RX ORDER — RIZATRIPTAN BENZOATE 10 MG/1
10 TABLET, ORALLY DISINTEGRATING ORAL
Qty: 30 TABLET | Refills: 3 | Status: SHIPPED | OUTPATIENT
Start: 2023-09-25 | End: 2024-09-23

## 2023-09-25 ASSESSMENT — ENCOUNTER SYMPTOMS
HEARTBURN: 0
EYE PAIN: 0
HEMATOCHEZIA: 0
BREAST MASS: 0
FREQUENCY: 0
SHORTNESS OF BREATH: 0
ARTHRALGIAS: 0
ABDOMINAL PAIN: 0
DIZZINESS: 0
SORE THROAT: 0
MYALGIAS: 0
JOINT SWELLING: 0
FEVER: 0
COUGH: 0
NAUSEA: 0
CONSTIPATION: 0
PALPITATIONS: 0
CHILLS: 0
HEADACHES: 0
DYSURIA: 0
HEMATURIA: 0
NERVOUS/ANXIOUS: 0
DIARRHEA: 0
PARESTHESIAS: 0
WEAKNESS: 0

## 2023-09-25 ASSESSMENT — PAIN SCALES - GENERAL: PAINLEVEL: NO PAIN (0)

## 2023-09-25 NOTE — PROGRESS NOTES
SUBJECTIVE:   CC: Luba is an 58 year old who presents for preventive health visit.       2023     2:16 PM   Additional Questions   Roomed by Sanjay PAIGE   Accompanied by No one         2023     2:16 PM   Patient Reported Additional Medications   Patient reports taking the following new medications None       Healthy Habits:     Getting at least 3 servings of Calcium per day:  Yes    Bi-annual eye exam:  Yes    Dental care twice a year:  Yes    Sleep apnea or symptoms of sleep apnea:  None    Diet:  Regular (no restrictions)    Frequency of exercise:  6-7 days/week    Duration of exercise:  45-60 minutes    Taking medications regularly:  Yes    Medication side effects:  None    Additional concerns today:  No      Tachycardia  Normal HR today.  No concerns    Migraine  Improved.  Off amitriptyline.  No worsening of migraines.  Refills given.  If frequency increases off preventive medication pt will follow up    Vertigo  Recurrent vertigo.  Managed with meclizine.  Improving over time.  Did have flares while on oral minoxidil for hair loss.  Better since stopping the minoxidil.          Social History     Tobacco Use    Smoking status: Never    Smokeless tobacco: Never   Substance Use Topics    Alcohol use: Yes     Comment: occasional, one every few months             2023     9:12 AM   Alcohol Use   Prescreen: >3 drinks/day or >7 drinks/week? No       Breast Cancer Screenin/12/2022     8:05 AM   Breast CA Risk Assessment (FHS-7)   Do you have a family history of breast, colon, or ovarian cancer? No / Unknown         Pertinent mammograms are reviewed under the imaging tab.    History of abnormal Pap smear: NO - age 30-65 PAP every 5 years with negative HPV co-testing recommended      Latest Ref Rng & Units 2022     1:35 PM 3/24/2017     2:00 PM 3/24/2017     1:31 PM   PAP / HPV   PAP  Negative for Intraepithelial Lesion or Malignancy (NILM)      PAP (Historical)    NIL    HPV 16 DNA  "Negative Negative  Negative     HPV 18 DNA Negative Negative  Negative     Other HR HPV Negative Negative  Negative       Reviewed and updated as needed this visit by clinical staff   Tobacco  Allergies  Meds              Reviewed and updated as needed this visit by Provider   Tobacco  Allergies  Meds  Problems  Med Hx  Surg Hx  Fam Hx             Review of Systems   Constitutional:  Negative for chills and fever.   HENT:  Negative for congestion, ear pain, hearing loss and sore throat.    Eyes:  Negative for pain and visual disturbance.   Respiratory:  Negative for cough and shortness of breath.    Cardiovascular:  Negative for chest pain, palpitations and peripheral edema.   Gastrointestinal:  Negative for abdominal pain, constipation, diarrhea, heartburn, hematochezia and nausea.   Breasts:  Negative for tenderness, breast mass and discharge.   Genitourinary:  Negative for dysuria, frequency, genital sores, hematuria, pelvic pain, urgency, vaginal bleeding and vaginal discharge.   Musculoskeletal:  Negative for arthralgias, joint swelling and myalgias.   Skin:  Negative for rash.   Neurological:  Negative for dizziness, weakness, headaches and paresthesias.   Psychiatric/Behavioral:  Negative for mood changes. The patient is not nervous/anxious.         OBJECTIVE:   BP (!) 141/93 (BP Location: Right arm, Patient Position: Sitting, Cuff Size: Adult Regular)   Pulse 83   Temp 98.3  F (36.8  C) (Oral)   Resp 18   Ht 1.626 m (5' 4\")   Wt 64.9 kg (143 lb)   LMP  (LMP Unknown)   SpO2 97%   BMI 24.55 kg/m    Physical Exam  Constitutional:       General: She is not in acute distress.     Appearance: Normal appearance. She is well-developed.   HENT:      Head: Normocephalic.      Right Ear: Tympanic membrane normal.      Left Ear: Tympanic membrane normal.      Mouth/Throat:      Mouth: Mucous membranes are moist.   Eyes:      Conjunctiva/sclera: Conjunctivae normal.   Cardiovascular:      Rate and " Rhythm: Normal rate and regular rhythm.      Heart sounds: Normal heart sounds.   Pulmonary:      Effort: Pulmonary effort is normal.      Breath sounds: Normal breath sounds.   Abdominal:      General: Bowel sounds are normal.      Palpations: Abdomen is soft. There is no mass.      Tenderness: There is no abdominal tenderness.   Musculoskeletal:      Cervical back: Neck supple.   Lymphadenopathy:      Cervical: No cervical adenopathy.   Skin:     General: Skin is warm and dry.      Findings: No rash.   Neurological:      General: No focal deficit present.      Mental Status: She is alert and oriented to person, place, and time.   Psychiatric:         Mood and Affect: Mood normal.         Thought Content: Thought content normal.         Judgment: Judgment normal.         ICD-10-CM    1. Routine general medical examination at a health care facility  Z00.00       2. Screen for colon cancer  Z12.11       3. Visit for screening mammogram  Z12.31 MA SCREENING DIGITAL BILAT - Future  (s+30)      4. Tachycardia  R00.0       5. Migraine with aura and with status migrainosus, not intractable  G43.101 rizatriptan (MAXALT-MLT) 10 MG ODT      6. Vertigo  R42 meclizine (ANTIVERT) 25 MG tablet          Routine exam--declines colon cancer screening  Migraine stable  Vertigo, improved  Tachycardia resolved        COUNSELING:  Reviewed preventive health counseling, as reflected in patient instructions       Healthy diet/nutrition        She reports that she has never smoked. She has never used smokeless tobacco.          KIRT Tony [facility]

## 2023-09-25 NOTE — PROGRESS NOTES
Prior to immunization administration, verified patients identity using patient s name and date of birth. Please see Immunization Activity for additional information.     Screening Questionnaire for Adult Immunization    Are you sick today?   No   Do you have allergies to medications, food, a vaccine component or latex?   No   Have you ever had a serious reaction after receiving a vaccination?   No   Do you have a long-term health problem with heart, lung, kidney, or metabolic disease (e.g., diabetes), asthma, a blood disorder, no spleen, complement component deficiency, a cochlear implant, or a spinal fluid leak?  Are you on long-term aspirin therapy?   No   Do you have cancer, leukemia, HIV/AIDS, or any other immune system problem?   No   Do you have a parent, brother, or sister with an immune system problem?   No   In the past 3 months, have you taken medications that affect  your immune system, such as prednisone, other steroids, or anticancer drugs; drugs for the treatment of rheumatoid arthritis, Crohn s disease, or psoriasis; or have you had radiation treatments?   No   Have you had a seizure, or a brain or other nervous system problem?   No   During the past year, have you received a transfusion of blood or blood    products, or been given immune (gamma) globulin or antiviral drug?   No   For women: Are you pregnant or is there a chance you could become       pregnant during the next month?   No   Have you received any vaccinations in the past 4 weeks?   No     Immunization questionnaire answers were all negative.      Patient instructed to remain in clinic for 15 minutes afterwards, and to report any adverse reactions.     Screening performed by Sanjay Hammond MA on 9/25/2023 at 3:23 PM.

## 2023-09-25 NOTE — ASSESSMENT & PLAN NOTE
Recurrent vertigo.  Managed with meclizine.  Improving over time.  Did have flares while on oral minoxidil for hair loss.  Better since stopping the minoxidil.   Report given to day shift NurseRaji RN. Report included SBAR, MAR, KARDEX,  LAB RESULTS, INTAKE/OUTPUT and all care given during the night.

## 2023-09-25 NOTE — ASSESSMENT & PLAN NOTE
Improved.  Off amitriptyline.  No worsening of migraines.  Refills given.  If frequency increases off preventive medication pt will follow up

## 2023-09-26 PROBLEM — R00.0 TACHYCARDIA: Status: RESOLVED | Noted: 2022-09-19 | Resolved: 2023-09-26

## 2023-10-02 NOTE — TELEPHONE ENCOUNTER
Patient Quality Outreach    Patient is due for the following:   Colon Cancer Screening  Breast Cancer Screening - Mammogram    Next Steps:   No follow up needed at this time.    Type of outreach:    Sent letter.    Next Steps:  Reach out within 90 days via Letter.    Max number of attempts reached: Yes. Will try again in 90 days if patient still on fail list.    Questions for provider review:    None           Ashanti Ramos MA

## 2023-11-10 ENCOUNTER — IMMUNIZATION (OUTPATIENT)
Dept: NURSING | Facility: CLINIC | Age: 58
End: 2023-11-10
Payer: COMMERCIAL

## 2023-11-10 PROCEDURE — 91320 SARSCV2 VAC 30MCG TRS-SUC IM: CPT

## 2023-11-10 PROCEDURE — 90480 ADMN SARSCOV2 VAC 1/ONLY CMP: CPT

## 2024-01-08 ENCOUNTER — TELEPHONE (OUTPATIENT)
Dept: FAMILY MEDICINE | Facility: CLINIC | Age: 59
End: 2024-01-08

## 2024-01-08 NOTE — LETTER
[facility]  [address] [address] [phone]       April 8, 2024    Merlene Rodri  [address] [place] [address]    Dear Luba,    We care about your health and have reviewed your health plan and are making recommendations based on this review, to optimize your health.    You are in particular need of attention regarding:  -Breast Cancer Screening  -Colon Cancer Screening    We are recommending that you:  -schedule a MAMMOGRAM which is due.    1 in 8 women will develop invasive breast cancer during her lifetime and it is the most common non-skin cancer in American women.  EARLY detection, new treatments, and a better understanding of the disease have increased survival rates - the 5 year survival rate in the 1960s was 63% and today it is close to 90%.    If you are under/uninsured, we recommend you contact the [facility]. They offer mammograms at no charge or on a sliding fee charge. You can schedule with them at [phone]. Please have them send us the results.      Please disregard this reminder if you have had this exam elsewhere within the last year.  It would be helpful for us to have a copy of your mammogram report in your file so that we can best coordinate your care - please contact us with when your test was done so we can update your record.             -schedule a COLONOSCOPY to look for colon cancer (due every 10 years or 5 years in higher risk situations.)        Colon cancer is now the second leading cause of cancer-related deaths in the [place] for both men and women and there are over 130,000 new cases and 50,000 deaths per year from colon cancer.  Colonoscopies can prevent 90-95% of these deaths.  Problem lesions can be removed before they ever become cancer.  This test is not only looking for cancer, but also getting rid of precancerious lesions.    If you are under/uninsured, we recommend you contact the [facility]. [facility] is a free colorectal cancer screening program that provides colonoscopies for eligible under/uninsured [place] men and women. If you are interested in receiving a free colonoscopy, please call [facility] at [phone] (mention code [facility]) to see if you re eligible.      If you do not wish to do a colonoscopy or cannot afford to do one, at this time, there is another option. It is called a FIT test or Fecal Immunochemical Occult Blood Test (take home stool sample kit).  It does not replace the colonoscopy for colorectal cancer screening, but it can detect hidden bleeding in the lower colon.  It does need to be repeated every year and if a positive result is obtained, you would be referred for a colonoscopy.          If you have completed either one of these tests at another facility, please call with the details of when and where the tests were done and if they were normal or not. Or have the records sent to our clinic so that we can best coordinate your care.    In addition, here is a list of due or overdue Health Maintenance reminders.    Health Maintenance Due   Topic Date Due    Colorectal Cancer Screening  Never done    Mammogram  08/29/2019    Cholesterol Lab  08/29/2023    Hepatitis B Vaccine (2 of 3 - 19+ 3-dose series) 10/23/2023    PHQ-2 (once per calendar year)  01/01/2024       To address the above recommendations, we encourage you to contact us at [phone], via [employer] or by contacting Central Scheduling toll free at [phone] 24 hours a day. They will assist you with finding the most convenient time and location.    Thank you for trusting [facility] and we appreciate the opportunity to serve you.  We look forward to supporting your healthcare needs in the future.    Healthy Regards,    Your [facility] Team

## 2024-04-08 NOTE — TELEPHONE ENCOUNTER
Patient Quality Outreach    Patient is due for the following:   Colon Cancer Screening  Breast Cancer Screening - Mammogram    Next Steps:   No follow up needed at this time.    Type of outreach:    Sent letter.      Questions for provider review:    None           Ashanti Ramos MA

## 2024-06-01 ENCOUNTER — HEALTH MAINTENANCE LETTER (OUTPATIENT)
Age: 59
End: 2024-06-01

## 2024-09-23 ENCOUNTER — OFFICE VISIT (OUTPATIENT)
Dept: FAMILY MEDICINE | Facility: CLINIC | Age: 59
End: 2024-09-23
Payer: COMMERCIAL

## 2024-09-23 VITALS
RESPIRATION RATE: 16 BRPM | HEART RATE: 84 BPM | TEMPERATURE: 98.3 F | BODY MASS INDEX: 23.42 KG/M2 | SYSTOLIC BLOOD PRESSURE: 119 MMHG | HEIGHT: 64 IN | DIASTOLIC BLOOD PRESSURE: 85 MMHG | OXYGEN SATURATION: 98 % | WEIGHT: 137.2 LBS

## 2024-09-23 DIAGNOSIS — R42 VERTIGO: ICD-10-CM

## 2024-09-23 DIAGNOSIS — J30.2 SEASONAL ALLERGIES: ICD-10-CM

## 2024-09-23 DIAGNOSIS — Z12.11 SCREEN FOR COLON CANCER: ICD-10-CM

## 2024-09-23 DIAGNOSIS — Z12.11 SPECIAL SCREENING FOR MALIGNANT NEOPLASMS, COLON: ICD-10-CM

## 2024-09-23 DIAGNOSIS — G56.03 BILATERAL CARPAL TUNNEL SYNDROME: ICD-10-CM

## 2024-09-23 DIAGNOSIS — J32.0 CHRONIC MAXILLARY SINUSITIS: ICD-10-CM

## 2024-09-23 DIAGNOSIS — Z00.00 ROUTINE GENERAL MEDICAL EXAMINATION AT A HEALTH CARE FACILITY: Primary | ICD-10-CM

## 2024-09-23 DIAGNOSIS — G43.101 MIGRAINE WITH AURA AND WITH STATUS MIGRAINOSUS, NOT INTRACTABLE: ICD-10-CM

## 2024-09-23 DIAGNOSIS — Z13.220 LIPID SCREENING: ICD-10-CM

## 2024-09-23 LAB
CHOLEST SERPL-MCNC: 215 MG/DL
FASTING STATUS PATIENT QL REPORTED: NO
HDLC SERPL-MCNC: 76 MG/DL
LDLC SERPL CALC-MCNC: 125 MG/DL
NONHDLC SERPL-MCNC: 139 MG/DL
TRIGL SERPL-MCNC: 68 MG/DL

## 2024-09-23 PROCEDURE — 80061 LIPID PANEL: CPT | Performed by: NURSE PRACTITIONER

## 2024-09-23 PROCEDURE — 99213 OFFICE O/P EST LOW 20 MIN: CPT | Mod: 25 | Performed by: NURSE PRACTITIONER

## 2024-09-23 PROCEDURE — 90472 IMMUNIZATION ADMIN EACH ADD: CPT | Performed by: NURSE PRACTITIONER

## 2024-09-23 PROCEDURE — 90480 ADMN SARSCOV2 VAC 1/ONLY CMP: CPT | Performed by: NURSE PRACTITIONER

## 2024-09-23 PROCEDURE — 91320 SARSCV2 VAC 30MCG TRS-SUC IM: CPT | Performed by: NURSE PRACTITIONER

## 2024-09-23 PROCEDURE — 90746 HEPB VACCINE 3 DOSE ADULT IM: CPT | Performed by: NURSE PRACTITIONER

## 2024-09-23 PROCEDURE — 90471 IMMUNIZATION ADMIN: CPT | Performed by: NURSE PRACTITIONER

## 2024-09-23 PROCEDURE — 90673 RIV3 VACCINE NO PRESERV IM: CPT | Performed by: NURSE PRACTITIONER

## 2024-09-23 PROCEDURE — 99396 PREV VISIT EST AGE 40-64: CPT | Mod: 25 | Performed by: NURSE PRACTITIONER

## 2024-09-23 PROCEDURE — 36415 COLL VENOUS BLD VENIPUNCTURE: CPT | Performed by: NURSE PRACTITIONER

## 2024-09-23 RX ORDER — RIZATRIPTAN BENZOATE 10 MG/1
10 TABLET, ORALLY DISINTEGRATING ORAL
Qty: 30 TABLET | Refills: 3 | Status: SHIPPED | OUTPATIENT
Start: 2024-09-23

## 2024-09-23 RX ORDER — MECLIZINE HYDROCHLORIDE 25 MG/1
25 TABLET ORAL EVERY 6 HOURS PRN
Qty: 30 TABLET | Refills: 1 | Status: SHIPPED | OUTPATIENT
Start: 2024-09-23

## 2024-09-23 ASSESSMENT — ENCOUNTER SYMPTOMS
UNEXPECTED WEIGHT CHANGE: 0
CHILLS: 0
CONSTIPATION: 0
DYSURIA: 0
PALPITATIONS: 0
NERVOUS/ANXIOUS: 1
CHEST TIGHTNESS: 0
WEAKNESS: 0
ABDOMINAL PAIN: 0
SHORTNESS OF BREATH: 0
FEVER: 0
DIARRHEA: 0

## 2024-09-23 ASSESSMENT — PAIN SCALES - GENERAL: PAINLEVEL: NO PAIN (0)

## 2024-09-23 NOTE — NURSING NOTE
"No chief complaint on file.    Initial /85 (BP Location: Right arm, Patient Position: Sitting, Cuff Size: Adult Regular)   Pulse 84   Temp 98.3  F (36.8  C) (Oral)   Resp 16   Ht 1.619 m (5' 3.75\")   Wt 62.2 kg (137 lb 3.2 oz)   LMP  (LMP Unknown)   SpO2 98%   BMI 23.74 kg/m   Estimated body mass index is 23.74 kg/m  as calculated from the following:    Height as of this encounter: 1.619 m (5' 3.75\").    Weight as of this encounter: 62.2 kg (137 lb 3.2 oz).  BP completed using cuff size regular right arm    Lisa Magill, CMA    "

## 2024-09-23 NOTE — PROGRESS NOTES
Prior to immunization administration, verified patients identity using patient s name and date of birth. Please see Immunization Activity for additional information.     Screening Questionnaire for Adult Immunization    Are you sick today?   No   Do you have allergies to medications, food, a vaccine component or latex?   No   Have you ever had a serious reaction after receiving a vaccination?   No   Do you have a long-term health problem with heart, lung, kidney, or metabolic disease (e.g., diabetes), asthma, a blood disorder, no spleen, complement component deficiency, a cochlear implant, or a spinal fluid leak?  Are you on long-term aspirin therapy?   No   Do you have cancer, leukemia, HIV/AIDS, or any other immune system problem?   No   Do you have a parent, brother, or sister with an immune system problem?   No   In the past 3 months, have you taken medications that affect  your immune system, such as prednisone, other steroids, or anticancer drugs; drugs for the treatment of rheumatoid arthritis, Crohn s disease, or psoriasis; or have you had radiation treatments?   No   Have you had a seizure, or a brain or other nervous system problem?   No   During the past year, have you received a transfusion of blood or blood    products, or been given immune (gamma) globulin or antiviral drug?   No   For women: Are you pregnant or is there a chance you could become       pregnant during the next month?   No   Have you received any vaccinations in the past 4 weeks?   No     Immunization questionnaire answers were all negative.      Patient instructed to remain in clinic for 15 minutes afterwards, and to report any adverse reactions.     Screening performed by Lisa A. Magill, CMA on 9/23/2024 at 1:40 PM.

## 2024-09-23 NOTE — PROGRESS NOTES
Assessment and Plan  Routine general medical examination at a health care facility    Migraine with aura and with status migrainosus, not intractable   5 headaches per month.  If worsening should consider prophylactic medication  - rizatriptan (MAXALT-MLT) 10 MG ODT  Dispense: 30 tablet; Refill: 3    Vertigo   Recurrent.  None currently  - meclizine (ANTIVERT) 25 MG tablet  Dispense: 30 tablet; Refill: 1   Did discuss mind-body connection with somatic symptoms.  If she is interested in more information regarding this can make follow up appointment.    Screen for colon cancer   Declines colonoscopy.  Prefers FIT testing.    Bilateral carpal tunnel syndrome   Post surgical recurrent intermittent symptoms.  Follow up if worsening    Chronic maxillary sinusitis   Currently managed with managin allergy symptoms    Seasonal allergies   Managed with nasal spray and OTC antihistamines.    If worsening would refer back to allergy    Lipid screening  - Lipid panel reflex to direct LDL Non-fasting  - Lipid panel reflex to direct LDL Non-fasting    Special screening for malignant neoplasms, colon  - Fecal colorectal cancer screen (FIT)  - Fecal colorectal cancer screen (FIT)    No follow-ups on file.    KIRT Tony CNP  [facility] [place]    ============================================  Subjective   Merlene Mace is a 59 year old female   here for a Physical Exam    Carpal tunnel syndrome  Continues with symptoms.  Post bilateral surgical treatment.  Declines further evaluation.    Chronic maxillary sinusitis  Chronic symptoms--allergy related.  Using nasacort and zyrtec/claritin.    Seasonal allergies  Using claritin, nasacort and zyrtec.    Stable, but symptomatic.    Vertigo  Some improvement, still has recurrent episodes.    Migraine  Using maxalt prn.  Up to 5 per month.    Health Care Directive    Patient Care Team:  Stephany Ferrer APRN CNP as PCP - General (Family Practice)  Nabil  KIRT Hammond CNP as Assigned PCP        9/21/2024   General Health   How would you rate your overall physical health? Good   Feel stress (tense, anxious, or unable to sleep) Patient declined            9/21/2024   Nutrition   Three or more servings of calcium each day? Yes   Diet: Regular (no restrictions)   How many servings of fruit and vegetables per day? (!) 2-3   How many sweetened beverages each day? 0-1            9/21/2024   Exercise   Days per week of moderate/strenous exercise 7 days   Average minutes spent exercising at this level 40 min              9/21/2024   Social Factors   Frequency of gathering with friends or relatives Patient declined   Worry food won't last until get money to buy more Patient declined   Food not last or not have enough money for food? Patient declined   Do you have housing? (Housing is defined as stable permanent housing and does not include staying ouside in a car, in a tent, in an abandoned building, in an overnight shelter, or couch-surfing.) Patient declined   Are you worried about losing your housing? Patient declined   Lack of transportation? Patient declined   Unable to get utilities (heat,electricity)? Patient declined            9/21/2024   Fall Risk   Fallen 2 or more times in the past year? No   Trouble with walking or balance? No             9/21/2024   Dental   Dentist two times every year? Yes            9/21/2024   TB Screening   Were you born outside of the [place]? No            Declined PHQ2        9/21/2024   Substance Use   Alcohol more than 3/day or more than 7/wk No   Do you use any other substances recreationally? No          Social History     Tobacco Use    Smoking status: Never    Smokeless tobacco: Never   Vaping Use    Vaping status: Never Used   Substance Use Topics    Alcohol use: Yes     Comment: occasional, one every few months    Drug use: No                  9/21/2024   STI Screening   New sexual partner(s) since last STI/HIV test? No        History  of abnormal Pap smear:         Latest Ref Rng & Units 9/19/2022     1:35 PM 3/24/2017     2:00 PM 3/24/2017     1:31 PM   PAP / HPV   PAP  Negative for Intraepithelial Lesion or Malignancy (NILM)      PAP (Historical)    NIL    HPV 16 DNA Negative Negative  Negative     HPV 18 DNA Negative Negative  Negative     Other HR HPV Negative Negative  Negative       ASCVD Risk   The ASCVD Risk score (Anthony BURRELL, et al., 2019) failed to calculate for the following reasons:    Cannot find a previous HDL lab    Cannot find a previous total cholesterol lab         The Following Health Maintenance Topics are reviewed and are correct as of today:  Health Maintenance   Topic Date Due    COLORECTAL CANCER SCREENING  Never done    MAMMO SCREENING  08/29/2019    LIPID  08/29/2023    PHQ-2 (once per calendar year)  01/01/2024    HEPATITIS B IMMUNIZATION (3 of 3 - 19+ 3-dose series) 11/18/2024    GLUCOSE  09/19/2025    YEARLY PREVENTIVE VISIT  09/23/2025    ANNUAL REVIEW OF HM ORDERS  09/23/2025    ADVANCE CARE PLANNING  09/12/2026    DTAP/TDAP/TD IMMUNIZATION (2 - Td or Tdap) 03/24/2027    HPV TEST  09/19/2027    PAP  09/19/2027    RSV VACCINE (1 - 1-dose 75+ series) 07/02/2040    HEPATITIS C SCREENING  Completed    MIGRAINE ACTION PLAN  Completed    INFLUENZA VACCINE  Completed    ZOSTER IMMUNIZATION  Completed    COVID-19 Vaccine  Completed    Pneumococcal Vaccine: Pediatrics (0 to 5 Years) and At-Risk Patients (6 to 64 Years)  Aged Out    HPV IMMUNIZATION  Aged Out    MENINGITIS IMMUNIZATION  Aged Out    RSV MONOCLONAL ANTIBODY  Aged Out    HIV SCREENING  Discontinued     Reviewed and updated as needed this visit by Provider   Tobacco  Allergies  Meds  Problems  Med Hx  Surg Hx  Fam Hx         HPI  Review of Systems   Constitutional:  Negative for chills, fever and unexpected weight change.   HENT:  Positive for congestion.    Respiratory:  Negative for chest tightness and shortness of breath.    Cardiovascular:   "Negative for chest pain and palpitations.   Gastrointestinal:  Negative for abdominal pain, constipation and diarrhea.   Genitourinary:  Negative for dysuria.   Skin:  Positive for rash.        Chronic scalp rash that she is seeing derm for.   Neurological:  Negative for weakness.        Recurrent symptoms of carpal tunnel when stressed or doing \"too much.\"  No weakness and symptoms are not constant.   Psychiatric/Behavioral:  The patient is nervous/anxious.         Anxiety remains and issue and she feels it contributes to migraine, vertigo and carpal tunnel symptoms     ============================================  Objective    Exam  /85 (BP Location: Right arm, Patient Position: Sitting, Cuff Size: Adult Regular)   Pulse 84   Temp 98.3  F (36.8  C) (Oral)   Resp 16   Ht 1.619 m (5' 3.75\")   Wt 62.2 kg (137 lb 3.2 oz)   LMP  (LMP Unknown)   SpO2 98%   BMI 23.74 kg/m    Physical Exam  Constitutional:       Appearance: Normal appearance.   HENT:      Right Ear: Tympanic membrane normal.      Left Ear: Tympanic membrane normal.      Nose: Nose normal.      Mouth/Throat:      Mouth: Mucous membranes are moist.      Pharynx: Oropharynx is clear.   Eyes:      Conjunctiva/sclera: Conjunctivae normal.   Cardiovascular:      Rate and Rhythm: Normal rate and regular rhythm.      Heart sounds: Normal heart sounds.   Pulmonary:      Effort: Pulmonary effort is normal.      Breath sounds: Normal breath sounds.   Abdominal:      General: Abdomen is flat. Bowel sounds are normal.      Palpations: Abdomen is soft.      Tenderness: There is no abdominal tenderness.   Musculoskeletal:      Cervical back: Neck supple.      Right lower leg: No edema.      Left lower leg: No edema.   Skin:     General: Skin is warm and dry.      Findings: No rash.   Neurological:      Mental Status: She is alert.   Psychiatric:         Mood and Affect: Mood normal.             "

## 2024-09-23 NOTE — PATIENT INSTRUCTIONS
If headaches worse or vertigo or carpal tunnel follow up with me        Patient Education   Preventive Care Advice   This is general advice given by our system to help you stay healthy. However, your care team may have specific advice just for you. Please talk to your care team about your preventive care needs.  Nutrition  Eat 5 or more servings of fruits and vegetables each day.  Try wheat bread, brown rice and whole grain pasta (instead of white bread, rice, and pasta).  Get enough calcium and vitamin D. Check the label on foods and aim for 100% of the RDA (recommended daily allowance).  Lifestyle  Exercise at least 150 minutes each week  (30 minutes a day, 5 days a week).  Do muscle strengthening activities 2 days a week. These help control your weight and prevent disease.  No smoking.  Wear sunscreen to prevent skin cancer.  Have a dental exam and cleaning every 6 months.  Yearly exams  See your health care team every year to talk about:  Any changes in your health.  Any medicines your care team has prescribed.  Preventive care, family planning, and ways to prevent chronic diseases.  Shots (vaccines)   HPV shots (up to age 26), if you've never had them before.  Hepatitis B shots (up to age 59), if you've never had them before.  COVID-19 shot: Get this shot when it's due.  Flu shot: Get a flu shot every year.  Tetanus shot: Get a tetanus shot every 10 years.  Pneumococcal, hepatitis A, and RSV shots: Ask your care team if you need these based on your risk.  Shingles shot (for age 50 and up)  General health tests  Diabetes screening:  Starting at age 35, Get screened for diabetes at least every 3 years.  If you are younger than age 35, ask your care team if you should be screened for diabetes.  Cholesterol test: At age 39, start having a cholesterol test every 5 years, or more often if advised.  Bone density scan (DEXA): At age 50, ask your care team if you should have this scan for osteoporosis (brittle  bones).  Hepatitis C: Get tested at least once in your life.  STIs (sexually transmitted infections)  Before age 24: Ask your care team if you should be screened for STIs.  After age 24: Get screened for STIs if you're at risk. You are at risk for STIs (including HIV) if:  You are sexually active with more than one person.  You don't use condoms every time.  You or a partner was diagnosed with a sexually transmitted infection.  If you are at risk for HIV, ask about PrEP medicine to prevent HIV.  Get tested for HIV at least once in your life, whether you are at risk for HIV or not.  Cancer screening tests  Cervical cancer screening: If you have a cervix, begin getting regular cervical cancer screening tests starting at age 21.  Breast cancer scan (mammogram): If you've ever had breasts, begin having regular mammograms starting at age 40. This is a scan to check for breast cancer.  Colon cancer screening: It is important to start screening for colon cancer at age 45.  Have a colonoscopy test every 10 years (or more often if you're at risk) Or, ask your provider about stool tests like a FIT test every year or Cologuard test every 3 years.  To learn more about your testing options, visit:   .  For help making a decision, visit:   https://bit.ly/tc09479.  Prostate cancer screening test: If you have a prostate, ask your care team if a prostate cancer screening test (PSA) at age 55 is right for you.  Lung cancer screening: If you are a current or former smoker ages 50 to 80, ask your care team if ongoing lung cancer screenings are right for you.  For informational purposes only. Not to replace the advice of your health care provider. Copyright   2023 [employer]. All rights reserved. Clinically reviewed by the [facility] Program. [employer] 429867 - REV 01/24.

## 2024-11-07 ENCOUNTER — TELEPHONE (OUTPATIENT)
Dept: FAMILY MEDICINE | Facility: CLINIC | Age: 59
End: 2024-11-07

## 2024-11-07 NOTE — TELEPHONE ENCOUNTER
Patient Quality Outreach    Patient is due for the following:   Colon Cancer Screening  Breast Cancer Screening - Mammogram  Depression  -  PHQ-9 needed    Next Steps:   No follow up needed at this time.    Type of outreach:    Sent [employer] message.      Questions for provider review:    None           Elda Dunn MA

## 2025-06-14 ENCOUNTER — HEALTH MAINTENANCE LETTER (OUTPATIENT)
Age: 60
End: 2025-06-14